# Patient Record
Sex: FEMALE | Race: WHITE | Employment: OTHER | URBAN - METROPOLITAN AREA
[De-identification: names, ages, dates, MRNs, and addresses within clinical notes are randomized per-mention and may not be internally consistent; named-entity substitution may affect disease eponyms.]

---

## 2017-01-10 ENCOUNTER — OFFICE VISIT (OUTPATIENT)
Dept: FAMILY MEDICINE | Facility: CLINIC | Age: 77
End: 2017-01-10
Payer: COMMERCIAL

## 2017-01-10 VITALS
HEART RATE: 88 BPM | BODY MASS INDEX: 31.53 KG/M2 | RESPIRATION RATE: 20 BRPM | WEIGHT: 167 LBS | TEMPERATURE: 97.5 F | DIASTOLIC BLOOD PRESSURE: 78 MMHG | HEIGHT: 61 IN | SYSTOLIC BLOOD PRESSURE: 128 MMHG | OXYGEN SATURATION: 97 %

## 2017-01-10 DIAGNOSIS — G89.4 CHRONIC PAIN SYNDROME: ICD-10-CM

## 2017-01-10 DIAGNOSIS — M48.061 LUMBAR SPINAL STENOSIS: ICD-10-CM

## 2017-01-10 DIAGNOSIS — D50.9 IRON DEFICIENCY ANEMIA, UNSPECIFIED IRON DEFICIENCY ANEMIA TYPE: ICD-10-CM

## 2017-01-10 DIAGNOSIS — Z23 NEED FOR PROPHYLACTIC VACCINATION AGAINST STREPTOCOCCUS PNEUMONIAE (PNEUMOCOCCUS): ICD-10-CM

## 2017-01-10 DIAGNOSIS — M79.7 FIBROMYALGIA: ICD-10-CM

## 2017-01-10 DIAGNOSIS — I48.0 PAF (PAROXYSMAL ATRIAL FIBRILLATION) (H): ICD-10-CM

## 2017-01-10 DIAGNOSIS — E78.5 HYPERLIPIDEMIA WITH TARGET LDL LESS THAN 100: ICD-10-CM

## 2017-01-10 DIAGNOSIS — Z00.00 ROUTINE GENERAL MEDICAL EXAMINATION AT A HEALTH CARE FACILITY: Primary | ICD-10-CM

## 2017-01-10 DIAGNOSIS — K21.9 GASTROESOPHAGEAL REFLUX DISEASE WITHOUT ESOPHAGITIS: ICD-10-CM

## 2017-01-10 DIAGNOSIS — I10 HYPERTENSION GOAL BP (BLOOD PRESSURE) < 140/90: ICD-10-CM

## 2017-01-10 DIAGNOSIS — E03.9 HYPOTHYROIDISM, UNSPECIFIED TYPE: ICD-10-CM

## 2017-01-10 DIAGNOSIS — R73.01 IMPAIRED FASTING GLUCOSE: ICD-10-CM

## 2017-01-10 DIAGNOSIS — Z23 NEED FOR PROPHYLACTIC VACCINATION AND INOCULATION AGAINST INFLUENZA: ICD-10-CM

## 2017-01-10 DIAGNOSIS — I87.2 CHRONIC VENOUS INSUFFICIENCY: ICD-10-CM

## 2017-01-10 LAB
ALBUMIN SERPL-MCNC: 3.8 G/DL (ref 3.4–5)
ALP SERPL-CCNC: 75 U/L (ref 40–150)
ALT SERPL W P-5'-P-CCNC: 26 U/L (ref 0–50)
ANION GAP SERPL CALCULATED.3IONS-SCNC: 6 MMOL/L (ref 3–14)
AST SERPL W P-5'-P-CCNC: 31 U/L (ref 0–45)
BASOPHILS # BLD AUTO: 0.1 10E9/L (ref 0–0.2)
BASOPHILS NFR BLD AUTO: 0.7 %
BILIRUB SERPL-MCNC: 0.7 MG/DL (ref 0.2–1.3)
BUN SERPL-MCNC: 37 MG/DL (ref 7–30)
CALCIUM SERPL-MCNC: 8.8 MG/DL (ref 8.5–10.1)
CHLORIDE SERPL-SCNC: 100 MMOL/L (ref 94–109)
CHOLEST SERPL-MCNC: 155 MG/DL
CO2 SERPL-SCNC: 31 MMOL/L (ref 20–32)
CREAT SERPL-MCNC: 1.39 MG/DL (ref 0.52–1.04)
DIFFERENTIAL METHOD BLD: ABNORMAL
EOSINOPHIL # BLD AUTO: 0 10E9/L (ref 0–0.7)
EOSINOPHIL NFR BLD AUTO: 0 %
ERYTHROCYTE [DISTWIDTH] IN BLOOD BY AUTOMATED COUNT: 13 % (ref 10–15)
FERRITIN SERPL-MCNC: 25 NG/ML (ref 8–252)
GFR SERPL CREATININE-BSD FRML MDRD: 37 ML/MIN/1.7M2
GLUCOSE SERPL-MCNC: 107 MG/DL (ref 70–99)
HBA1C MFR BLD: 6 % (ref 4.3–6)
HCT VFR BLD AUTO: 40.3 % (ref 35–47)
HDLC SERPL-MCNC: 41 MG/DL
HGB BLD-MCNC: 13.2 G/DL (ref 11.7–15.7)
IRON SATN MFR SERPL: 24 % (ref 15–46)
IRON SERPL-MCNC: 86 UG/DL (ref 35–180)
LDLC SERPL CALC-MCNC: 79 MG/DL
LYMPHOCYTES # BLD AUTO: 2.3 10E9/L (ref 0.8–5.3)
LYMPHOCYTES NFR BLD AUTO: 24.4 %
MCH RBC QN AUTO: 29.8 PG (ref 26.5–33)
MCHC RBC AUTO-ENTMCNC: 32.8 G/DL (ref 31.5–36.5)
MCV RBC AUTO: 91 FL (ref 78–100)
MONOCYTES # BLD AUTO: 1.7 10E9/L (ref 0–1.3)
MONOCYTES NFR BLD AUTO: 17.9 %
NEUTROPHILS # BLD AUTO: 5.4 10E9/L (ref 1.6–8.3)
NEUTROPHILS NFR BLD AUTO: 57 %
NONHDLC SERPL-MCNC: 114 MG/DL
PLATELET # BLD AUTO: 242 10E9/L (ref 150–450)
POTASSIUM SERPL-SCNC: 3.6 MMOL/L (ref 3.4–5.3)
PROT SERPL-MCNC: 7.6 G/DL (ref 6.8–8.8)
RBC # BLD AUTO: 4.43 10E12/L (ref 3.8–5.2)
SODIUM SERPL-SCNC: 137 MMOL/L (ref 133–144)
TIBC SERPL-MCNC: 356 UG/DL (ref 240–430)
TRIGL SERPL-MCNC: 176 MG/DL
TSH SERPL DL<=0.005 MIU/L-ACNC: 0.65 MU/L (ref 0.4–4)
WBC # BLD AUTO: 9.6 10E9/L (ref 4–11)

## 2017-01-10 PROCEDURE — 90662 IIV NO PRSV INCREASED AG IM: CPT | Performed by: INTERNAL MEDICINE

## 2017-01-10 PROCEDURE — 90670 PCV13 VACCINE IM: CPT | Performed by: INTERNAL MEDICINE

## 2017-01-10 PROCEDURE — 83540 ASSAY OF IRON: CPT | Performed by: INTERNAL MEDICINE

## 2017-01-10 PROCEDURE — 85025 COMPLETE CBC W/AUTO DIFF WBC: CPT | Performed by: INTERNAL MEDICINE

## 2017-01-10 PROCEDURE — 84443 ASSAY THYROID STIM HORMONE: CPT | Performed by: INTERNAL MEDICINE

## 2017-01-10 PROCEDURE — G0009 ADMIN PNEUMOCOCCAL VACCINE: HCPCS | Performed by: INTERNAL MEDICINE

## 2017-01-10 PROCEDURE — 83550 IRON BINDING TEST: CPT | Performed by: INTERNAL MEDICINE

## 2017-01-10 PROCEDURE — G0008 ADMIN INFLUENZA VIRUS VAC: HCPCS | Performed by: INTERNAL MEDICINE

## 2017-01-10 PROCEDURE — 99397 PER PM REEVAL EST PAT 65+ YR: CPT | Mod: 25 | Performed by: INTERNAL MEDICINE

## 2017-01-10 PROCEDURE — 82728 ASSAY OF FERRITIN: CPT | Performed by: INTERNAL MEDICINE

## 2017-01-10 PROCEDURE — 36415 COLL VENOUS BLD VENIPUNCTURE: CPT | Performed by: INTERNAL MEDICINE

## 2017-01-10 PROCEDURE — 80061 LIPID PANEL: CPT | Performed by: INTERNAL MEDICINE

## 2017-01-10 PROCEDURE — 80053 COMPREHEN METABOLIC PANEL: CPT | Performed by: INTERNAL MEDICINE

## 2017-01-10 PROCEDURE — 83036 HEMOGLOBIN GLYCOSYLATED A1C: CPT | Performed by: INTERNAL MEDICINE

## 2017-01-10 RX ORDER — POTASSIUM CHLORIDE 750 MG/1
10 TABLET, EXTENDED RELEASE ORAL 2 TIMES DAILY
Qty: 180 TABLET | Refills: 3 | Status: SHIPPED | OUTPATIENT
Start: 2017-01-10 | End: 2018-01-17

## 2017-01-10 RX ORDER — ATORVASTATIN CALCIUM 40 MG/1
40 TABLET, FILM COATED ORAL DAILY
Qty: 90 TABLET | Refills: 3 | Status: SHIPPED | OUTPATIENT
Start: 2017-01-10 | End: 2018-01-17

## 2017-01-10 RX ORDER — TRAMADOL HYDROCHLORIDE 50 MG/1
50 TABLET ORAL 3 TIMES DAILY
Qty: 270 TABLET | Refills: 3 | Status: SHIPPED | OUTPATIENT
Start: 2017-01-10 | End: 2017-04-21

## 2017-01-10 RX ORDER — TRAZODONE HYDROCHLORIDE 50 MG/1
50 TABLET, FILM COATED ORAL AT BEDTIME
Qty: 90 TABLET | Refills: 3 | Status: SHIPPED | OUTPATIENT
Start: 2017-01-10 | End: 2018-01-17

## 2017-01-10 RX ORDER — BUMETANIDE 1 MG/1
TABLET ORAL
Qty: 180 TABLET | Refills: 3 | COMMUNITY
Start: 2017-01-10 | End: 2018-01-17

## 2017-01-10 RX ORDER — BUMETANIDE 1 MG/1
TABLET ORAL
Qty: 180 TABLET | Refills: 3 | Status: SHIPPED | OUTPATIENT
Start: 2017-01-10 | End: 2017-01-10

## 2017-01-10 RX ORDER — METOPROLOL TARTRATE 50 MG
50 TABLET ORAL 2 TIMES DAILY
Qty: 180 TABLET | Refills: 3 | Status: SHIPPED | OUTPATIENT
Start: 2017-01-10 | End: 2018-01-17

## 2017-01-10 RX ORDER — BUSPIRONE HYDROCHLORIDE 10 MG/1
10 TABLET ORAL 2 TIMES DAILY
Qty: 180 TABLET | Refills: 3 | Status: SHIPPED | OUTPATIENT
Start: 2017-01-10 | End: 2018-01-17

## 2017-01-10 RX ORDER — LEVOTHYROXINE SODIUM 137 UG/1
137 TABLET ORAL DAILY
Qty: 90 TABLET | Refills: 3 | Status: SHIPPED | OUTPATIENT
Start: 2017-01-10 | End: 2018-01-17

## 2017-01-10 RX ORDER — FERROUS SULFATE 325(65) MG
1 TABLET ORAL EVERY OTHER DAY
Qty: 30 TABLET | Refills: 2 | COMMUNITY
Start: 2017-01-10 | End: 2018-01-17

## 2017-01-10 NOTE — NURSING NOTE
"Chief Complaint   Patient presents with     Physical       Initial /78 mmHg  Pulse 88  Temp(Src) 97.5  F (36.4  C)  Resp 20  Ht 5' 1\" (1.549 m)  Wt 167 lb (75.751 kg)  BMI 31.57 kg/m2  SpO2 97%  Breastfeeding? No Estimated body mass index is 31.57 kg/(m^2) as calculated from the following:    Height as of this encounter: 5' 1\" (1.549 m).    Weight as of this encounter: 167 lb (75.751 kg).  BP completed using cuff size: zoraida Galdamez LPN  "

## 2017-01-10 NOTE — NURSING NOTE
Prior to injection verified patient identity using patient's name and date of birth.  Screening Questionnaire for Adult Immunization    Are you sick today?   No   Do you have allergies to medications, food, a vaccine component or latex?   No   Have you ever had a serious reaction after receiving a vaccination?   No   Do you have a long-term health problem with heart disease, lung disease, asthma, kidney disease, metabolic disease (e.g. diabetes), anemia, or other blood disorder?   No   Do you have cancer, leukemia, HIV/AIDS, or any other immune system problem?   No   In the past 3 months, have you taken medications that affect  your immune system, such as prednisone, other steroids, or anticancer drugs; drugs for the treatment of rheumatoid arthritis, Crohn s disease, or psoriasis; or have you had radiation treatments?   No   Have you had a seizure, or a brain or other nervous system problem?   No   During the past year, have you received a transfusion of blood or blood     products, or been given immune (gamma) globulin or antiviral drug?   No   For women: Are you pregnant or is there a chance you could become        pregnant during the next month?   No   Have you received any vaccinations in the past 4 weeks?   No     Immunization questionnaire answers were all negative.      MNVFC doesn't apply on this patient    Per orders of Dr. Narayanan, injection of Prevnar and Influenza given by Shanna Galdamez. Patient instructed to remain in clinic for 20 minutes afterwards, and to report any adverse reaction to me immediately.       Screening performed by Shanna Galdamez on 1/10/2017 at 11:54 AM.

## 2017-01-10 NOTE — PROGRESS NOTES
SUBJECTIVE:                                                            Jennifer Torres is a 76 year old female who presents for Preventive Visit.      Are you in the first 12 months of your Medicare Part B coverage?  No    Healthy Habits:    Do you get at least three servings of calcium containing foods daily (dairy, green leafy vegetables, etc.)? yes    Amount of exercise or daily activities, outside of work: occ.    Problems taking medications regularly No    Medication side effects: No    Have you had an eye exam in the past two years? yes    Do you see a dentist twice per year? yes    Do you have sleep apnea, excessive snoring or daytime drowsiness? Has CPAP, but declines to wear    COGNITIVE SCREEN  1) Repeat 3 items (Banana, Sunrise, Chair)    2) Clock draw: NORMAL  3) 3 item recall: Recalls 3 objects  Results: 3 items recalled: COGNITIVE IMPAIRMENT LESS LIKELY    Mini-CogTM Copyright S James. Licensed by the author for use in [facility]; reprinted with permission (iglesia@[email or web address]). All rights reserved.                    Some med changes per Cardiology.        Off amio,warfarin,amlo.        bumex 2 mg AM; KCL 20 meq per day. Metoprolol 50 bid.                                                                                          Grieving; a 33 yr old nephew killed himself.            All Histories reviewed and updated in [facility] as appropriate.  Social History   Substance Use Topics     Smoking status: Never Smoker      Smokeless tobacco: Never Used     Alcohol Use: Yes      Comment: occ.       The patient does not drink >3 drinks per day nor >7 drinks per week.    Today's PHQ-2 Score:   PHQ-2 ( 1999 Pfizer) 1/10/2017 9/6/2016   Q1: Little interest or pleasure in doing things 0 1   Q2: Feeling down, depressed or hopeless 0 1   PHQ-2 Score 0 2       Do you feel safe in your environment - Yes    Do you have a Health Care Directive?: Yes: Patient states has Advance Directive and will bring in a  copy to clinic.    Current providers sharing in care for this patient include:   Patient Care Team:  Rodney Narayanan MD as PCP - General (Internal Medicine)  Cheryl Estrada, RN as  (Family Medicine - Geriatric Medicine)      Hearing impairment: No    Ability to successfully perform activities of daily living: Yes, no assistance needed     Fall risk:  Fallen 2 or more times in the past year?: No  Any fall with injury in the past year?: No    Home safety:  none identified      The following health maintenance items are reviewed in Epic and correct as of today:  Health Maintenance   Topic Date Due     NOEMÍ QUESTIONNAIRE 1 YEAR  1940     URINE DRUG SCREEN Q1 YR  08/16/1955     ADVANCE DIRECTIVE PLANNING Q5 YRS (NO INBASKET)  08/16/1957     PNEUMOCOCCAL (2 of 2 - PCV13) 10/28/2010     PHQ-9 Q1YR (NO INBASKET)  07/10/2014     INFLUENZA VACCINE (SYSTEM ASSIGNED)  09/01/2016     FALL RISK ASSESSMENT  01/06/2017     LIPID SCREEN Q5 YR FEMALE (SYSTEM ASSIGNED)  01/06/2021     TETANUS IMMUNIZATION (SYSTEM ASSIGNED)  12/29/2024     DEXA SCAN SCREENING (SYSTEM ASSIGNED)  Completed         Pneumonia Vaccine:Adults age 65+ who received Pneumovax (PPSV23) at 65 years or older: Should be given PCV13 > 1 year after their most recent PPSV23  Mammogram Screening: Patient over age 75, has elected to continue with mammography screening.     Scheduled in the near future.     ROS:  C: NEGATIVE for fever, chills, change in weight  CONSTITUTIONAL:POSITIVE  for fatigue  I: NEGATIVE for worrisome rashes, moles or lesions  E: NEGATIVE for vision changes or irritation  E/M: NEGATIVE for ear, mouth and throat problems  R: NEGATIVE for significant cough or SOB  B: NEGATIVE for masses, tenderness or discharge  CV: NEGATIVE for chest pain/chest pressure and palpitations  GI: NEGATIVE for nausea, abdominal pain, heartburn, or change in bowel habits  : NEGATIVE for frequency, dysuria, or hematuria  MUSCULOSKELETAL:POSITIVE  for  arthralgias  and myalgia  N: NEGATIVE for weakness, dizziness or paresthesias  E: NEGATIVE for temperature intolerance, skin/hair changes  H: NEGATIVE for bleeding problems  P: NEGATIVE for changes in mood or affect    BP Readings from Last 3 Encounters:   01/10/17 128/78   09/06/16 124/64   01/06/16 138/78    Wt Readings from Last 3 Encounters:   01/10/17 167 lb (75.751 kg)   09/06/16 168 lb (76.204 kg)   01/06/16 184 lb (83.462 kg)                  Current Outpatient Prescriptions   Medication Sig Dispense Refill     traMADol (ULTRAM) 50 MG tablet Take 1 tablet (50 mg) by mouth 3 times daily 270 tablet 3     bisacodyl (DULCOLAX) 5 MG EC tablet Take 5-10 mg by mouth       nitroglycerin (NITROSTAT) 0.4 MG SL tablet Place 0.4 mg under the tongue       metoprolol (TOPROL XL) 50 MG 24 hr tablet Take 100 mg per day       bumetanide (BUMEX) 1 MG tablet TAKE 1 TABLET (1 MG) BY MOUTH 2 TIMES DAILY 180 tablet 3     busPIRone (BUSPAR) 10 MG tablet Take 1 tablet (10 mg) by mouth 2 times daily 180 tablet 3     levothyroxine (SYNTHROID, LEVOTHROID) 137 MCG tablet Take 1 tablet (137 mcg) by mouth daily 90 tablet 3     potassium chloride (K-DUR) 10 MEQ tablet Take 1 tablet (10 mEq) by mouth 2 times daily 180 tablet 3     atorvastatin (LIPITOR) 40 MG tablet Take 1 tablet (40 mg) by mouth daily 90 tablet 3     omeprazole (PRILOSEC) 20 MG capsule Take 1 capsule (20 mg) by mouth daily 90 capsule 3     aspirin 81 MG tablet Take 1 tablet by mouth daily       Multiple Vitamins-Minerals (MULTIVITAMIN OR) Take 1 tablet by mouth daily.       fish oil-omega-3 fatty acids (FISH OIL) 1000 MG capsule Take 1 g by mouth daily.               Blood Glucose Monitoring Suppl (BLOOD GLUCOSE SYSTEM YARELY) KIT Dispense meter, test strips, lancets covered by pt ins. 250.00 NIDDM type II - Test 1 time/day       traZODone (DESYREL) 50 MG tablet Take 50 mg by mouth       warfarin (COUMADIN) 5 MG tablet Take by mouth 2.5 mg (1/2 tab) daily and INR on 8/29 at  "2:10pm at [facility].          3     Cholecalciferol (VITAMIN D) 2000 UNITS CAPS Take 2 capsules by mouth daily.       No Known Allergies  OBJECTIVE:                                                            /78 mmHg  Pulse 88  Temp(Src) 97.5  F (36.4  C)  Resp 20  Ht 5' 1\" (1.549 m)  Wt 167 lb (75.751 kg)  BMI 31.57 kg/m2  SpO2 97%  Breastfeeding? No Estimated body mass index is 31.57 kg/(m^2) as calculated from the following:    Height as of this encounter: 5' 1\" (1.549 m).    Weight as of this encounter: 167 lb (75.751 kg).  EXAM:   GENERAL APPEARANCE: alert, no distress and fatigued  HENT: ear canals and TM's normal, nose and mouth without ulcers or lesions, oropharynx clear and oral mucous membranes moist  NECK: no adenopathy, no asymmetry, masses, or scars and thyroid normal to palpation  RESP: lungs clear to auscultation - no rales, rhonchi or wheezes  BREAST: fibrocystic changes bilateral  CV: regular rates and rhythm, normal S1 S2, no S3 or S4, no murmur, click or rub, peripheral pulses strong and trace edema  ABDOMEN: soft, nontender, no hepatosplenomegaly, no masses and bowel sounds normal  MS: no musculoskeletal defects are noted and gait is age appropriate without ataxia  SKIN: no suspicious lesions or rashes  NEURO: Normal strength and tone, sensory exam grossly normal, mentation intact and speech normal  PSYCH: fatigued    ASSESSMENT / PLAN:                                                            Jennifer was seen today for physical.    Diagnoses and all orders for this visit:    Routine general medical examination at a health care facility    Chronic pain syndrome  -     traMADol (ULTRAM) 50 MG tablet; Take 1 tablet (50 mg) by mouth 3 times daily    Hyperlipidemia with target LDL less than 100  -     Comprehensive metabolic panel (BMP + Alb, Alk Phos, ALT, AST, Total. Bili, TP)  -     Lipid Profile (Chol, Trig, HDL, LDL calc)  -     atorvastatin (LIPITOR) 40 MG tablet; Take 1 tablet (40 " mg) by mouth daily    Hypertension goal BP (blood pressure) < 140/90  -     Comprehensive metabolic panel (BMP + Alb, Alk Phos, ALT, AST, Total. Bili, TP)  -     bumetanide (BUMEX) 1 MG tablet; TAKE 1 TABLET (1 MG) BY MOUTH 2 TIMES DAILY  -     metoprolol (LOPRESSOR) 50 MG tablet; Take 1 tablet (50 mg) by mouth 2 times daily    Impaired fasting glucose  -     Comprehensive metabolic panel (BMP + Alb, Alk Phos, ALT, AST, Total. Bili, TP)  -     Hemoglobin A1c    Hypothyroidism, unspecified type  -     TSH with free T4 reflex  -     levothyroxine (SYNTHROID/LEVOTHROID) 137 MCG tablet; Take 1 tablet (137 mcg) by mouth daily    Iron deficiency anemia, unspecified iron deficiency anemia type  -     CBC with platelets and differential  -     Ferritin  -     Iron and iron binding capacity    Fibromyalgia  -     traMADol (ULTRAM) 50 MG tablet; Take 1 tablet (50 mg) by mouth 3 times daily  -     traZODone (DESYREL) 50 MG tablet; Take 1 tablet (50 mg) by mouth At Bedtime    Chronic venous insufficiency  -     potassium chloride (K-TAB,KLOR-CON) 10 MEQ tablet; Take 1 tablet (10 mEq) by mouth 2 times daily    PAF (paroxysmal atrial fibrillation) (H)    Lumbar spinal stenosis  -     traMADol (ULTRAM) 50 MG tablet; Take 1 tablet (50 mg) by mouth 3 times daily    Gastroesophageal reflux disease without esophagitis  -     omeprazole (PRILOSEC) 20 MG CR capsule; Take 1 capsule (20 mg) by mouth daily    Need for prophylactic vaccination and inoculation against influenza  -     FLU VACCINE, INCREASED ANTIGEN, PRESV FREE, AGE 65+ [77560]  -     ADMIN INFLUENZA[] (For MEDICARE Patients ONLY)     Need for prophylactic vaccination against Streptococcus pneumoniae (pneumococcus)  -     Pneumococcal vaccine 13 valent PCV13 IM (Prevnar) [78489]  -     ADMIN PNEUMOCOCCAL VACCINE (For MEDICARE Pt. ONLY) []    Other orders  -     busPIRone (BUSPAR) 10 MG tablet; Take 1 tablet (10 mg) by mouth 2 times daily        Multiple issues.        Check labs and adjust medications as indicated.      Med list updated and corrected.            Ok for tramadol refill.   Patient Instructions   I will let you know your lab results.               Prescription Medications as of 1/10/2017      After the visit            traMADol (ULTRAM) 50 MG tablet Take 1 tablet (50 mg) by mouth 3 times daily    potassium chloride (K-TAB,KLOR-CON) 10 MEQ tablet Take 1 tablet (10 mEq) by mouth 2 times daily    omeprazole (PRILOSEC) 20 MG CR capsule Take 1 capsule (20 mg) by mouth daily    levothyroxine (SYNTHROID/LEVOTHROID) 137 MCG tablet Take 1 tablet (137 mcg) by mouth daily    busPIRone (BUSPAR) 10 MG tablet Take 1 tablet (10 mg) by mouth 2 times daily    bumetanide (BUMEX) 1 MG tablet TAKE 1 TABLET (1 MG) BY MOUTH 2 TIMES DAILY    metoprolol (LOPRESSOR) 50 MG tablet Take 1 tablet (50 mg) by mouth 2 times daily    traZODone (DESYREL) 50 MG tablet Take 1 tablet (50 mg) by mouth At Bedtime    atorvastatin (LIPITOR) 40 MG tablet Take 1 tablet (40 mg) by mouth daily    bisacodyl (DULCOLAX) 5 MG EC tablet Take 5-10 mg by mouth    nitroglycerin (NITROSTAT) 0.4 MG SL tablet Place 0.4 mg under the tongue    aspirin 81 MG tablet Take 1 tablet by mouth daily    Multiple Vitamins-Minerals (MULTIVITAMIN OR) Take 1 tablet by mouth daily.    fish oil-omega-3 fatty acids (FISH OIL) 1000 MG capsule Take 1 g by mouth daily.    Blood Glucose Monitoring Suppl (BLOOD GLUCOSE SYSTEM YARELY) KIT Dispense meter, test strips, lancets covered by pt ins. 250.00 NIDDM type II - Test 1 time/day    Cholecalciferol (VITAMIN D) 2000 UNITS CAPS Take 2 capsules by mouth daily.                End of Life Planning:  Patient currently has an advanced directive: Yes.  Practitioner is supportive of decision.    COUNSELING:  Reviewed preventive health counseling, as reflected in patient instructions       Regular exercise       Healthy diet/nutrition        Estimated body mass index is 31.57 kg/(m^2) as calculated from  "the following:    Height as of this encounter: 5' 1\" (1.549 m).    Weight as of this encounter: 167 lb (75.751 kg).  Weight management plan: Discussed healthy diet and exercise guidelines and patient will follow up in 12 months in clinic to re-evaluate.   reports that she has never smoked. She has never used smokeless tobacco.      Appropriate preventive services were discussed with this patient, including applicable screening as appropriate for cardiovascular disease, diabetes, osteopenia/osteoporosis, and glaucoma.  As appropriate for age/gender, discussed screening for colorectal cancer, prostate cancer, breast cancer, and cervical cancer. Checklist reviewing preventive services available has been given to the patient.    Reviewed patients plan of care and provided an AVS. The Basic Care Plan (routine screening as documented in Health Maintenance) for Jennifer meets the Care Plan requirement. This Care Plan has been established and reviewed with the Patient.    Counseling Resources:  ATP IV Guidelines  Pooled Cohorts Equation Calculator  Breast Cancer Risk Calculator  FRAX Risk Assessment  ICSI Preventive Guidelines  Dietary Guidelines for Americans, 2010  USDA's MyPlate  ASA Prophylaxis  Lung CA Screening    Rodney Narayanan MD  [facility]   Results for orders placed or performed in visit on 01/10/17   Comprehensive metabolic panel (BMP + Alb, Alk Phos, ALT, AST, Total. Bili, TP)   Result Value Ref Range    Sodium 137 133 - 144 mmol/L    Potassium 3.6 3.4 - 5.3 mmol/L    Chloride 100 94 - 109 mmol/L    Carbon Dioxide 31 20 - 32 mmol/L    Anion Gap 6 3 - 14 mmol/L    Glucose 107 (H) 70 - 99 mg/dL    Urea Nitrogen 37 (H) 7 - 30 mg/dL    Creatinine 1.39 (H) 0.52 - 1.04 mg/dL    GFR Estimate 37 (L) >60 mL/min/1.7m2    GFR Estimate If Black 44 (L) >60 mL/min/1.7m2    Calcium 8.8 8.5 - 10.1 mg/dL    Bilirubin Total 0.7 0.2 - 1.3 mg/dL    Albumin 3.8 3.4 - 5.0 g/dL    Protein Total 7.6 6.8 " - 8.8 g/dL    Alkaline Phosphatase 75 40 - 150 U/L    ALT 26 0 - 50 U/L    AST 31 0 - 45 U/L   Hemoglobin A1c   Result Value Ref Range    Hemoglobin A1C 6.0 4.3 - 6.0 %   CBC with platelets and differential   Result Value Ref Range    WBC 9.6 4.0 - 11.0 10e9/L    RBC Count 4.43 3.8 - 5.2 10e12/L    Hemoglobin 13.2 11.7 - 15.7 g/dL    Hematocrit 40.3 35.0 - 47.0 %    MCV 91 78 - 100 fl    MCH 29.8 26.5 - 33.0 pg    MCHC 32.8 31.5 - 36.5 g/dL    RDW 13.0 10.0 - 15.0 %    Platelet Count 242 150 - 450 10e9/L    Diff Method Automated Method     % Neutrophils 57.0 %    % Lymphocytes 24.4 %    % Monocytes 17.9 %    % Eosinophils 0.0 %    % Basophils 0.7 %    Absolute Neutrophil 5.4 1.6 - 8.3 10e9/L    Absolute Lymphocytes 2.3 0.8 - 5.3 10e9/L    Absolute Monocytes 1.7 (H) 0.0 - 1.3 10e9/L    Absolute Eosinophils 0.0 0.0 - 0.7 10e9/L    Absolute Basophils 0.1 0.0 - 0.2 10e9/L   Ferritin   Result Value Ref Range    Ferritin 25 8 - 252 ng/mL   Iron and iron binding capacity   Result Value Ref Range    Iron 86 35 - 180 ug/dL    Iron Binding Cap 356 240 - 430 ug/dL    Iron Saturation Index 24 15 - 46 %   TSH with free T4 reflex   Result Value Ref Range    TSH 0.65 0.40 - 4.00 mU/L   Lipid Profile (Chol, Trig, HDL, LDL calc)   Result Value Ref Range    Cholesterol 155 <200 mg/dL    Triglycerides 176 (H) <150 mg/dL    HDL Cholesterol 41 (L) >49 mg/dL    LDL Cholesterol Calculated 79 <100 mg/dL    Non HDL Cholesterol 114 <130 mg/dL     Letter sent, and My chart message sent.                 Many of your lab results are good.    The thyroid level is good. Keep taking the same dosage.               However,there is mild kidney impairment.     The bumex dose should be cut back.    Try taking 1.5 mg, or 1 1/2 tablets per day instead of 2 mg or 2 tablets per day.       Also, your iron level is a bit low again.        I suggest that you resume the ferrous sulfate, at 325 mg every other day.          Also,your blood sugar (glucose) is  slightly elevated at 107.    Please work harder on restricting carbohydrates ( starches, sweets, etc).

## 2017-01-10 NOTE — Clinical Note
[facility]  [address]  [place] [address] [phone]                                                                                                           Jennifer Torres  [address] [address] [address]    January 10, 2017      Dear Jennifer,    The results of your recent tests were reviewed and are enclosed.           Many of your lab results are good.                                                                                       The thyroid level is good. Keep taking the same dosage.         However,there is mild kidney impairment.     The bumex dose should be cut back.    Try taking 1.5 mg, or 1 1/2 tablets per day instead of 2 mg or 2 tablets per day.                      Also, your iron level is a bit low again.        I suggest that you resume the ferrous sulfate, at 325 mg every other day.                                                                                            Also,your blood sugar (glucose) is slightly elevated at 107.    Please work harder on restricting carbohydrates ( starches, sweets, etc).      Thank you for choosing [facility].  We appreciate the opportunity to serve you and look forward to supporting your healthcare needs in the future.    If you have any questions or concerns, please call me or my staff at [phone].      Sincerely,    Rodney Narayanan MD    Results for orders placed or performed in visit on 01/10/17   Comprehensive metabolic panel (BMP + Alb, Alk Phos, ALT, AST, Total. Bili, TP)   Result Value Ref Range    Sodium 137 133 - 144 mmol/L    Potassium 3.6 3.4 - 5.3 mmol/L    Chloride 100 94 - 109 mmol/L    Carbon Dioxide 31 20 - 32 mmol/L    Anion Gap 6 3 - 14 mmol/L    Glucose 107 (H) 70 - 99 mg/dL    Urea Nitrogen 37 (H) 7 - 30 mg/dL    Creatinine 1.39 (H) 0.52 - 1.04 mg/dL    GFR Estimate 37 (L) >60 mL/min/1.7m2    GFR Estimate If Black 44  (L) >60 mL/min/1.7m2    Calcium 8.8 8.5 - 10.1 mg/dL    Bilirubin Total 0.7 0.2 - 1.3 mg/dL    Albumin 3.8 3.4 - 5.0 g/dL    Protein Total 7.6 6.8 - 8.8 g/dL    Alkaline Phosphatase 75 40 - 150 U/L    ALT 26 0 - 50 U/L    AST 31 0 - 45 U/L   Hemoglobin A1c   Result Value Ref Range    Hemoglobin A1C 6.0 4.3 - 6.0 %   CBC with platelets and differential   Result Value Ref Range    WBC 9.6 4.0 - 11.0 10e9/L    RBC Count 4.43 3.8 - 5.2 10e12/L    Hemoglobin 13.2 11.7 - 15.7 g/dL    Hematocrit 40.3 35.0 - 47.0 %    MCV 91 78 - 100 fl    MCH 29.8 26.5 - 33.0 pg    MCHC 32.8 31.5 - 36.5 g/dL    RDW 13.0 10.0 - 15.0 %    Platelet Count 242 150 - 450 10e9/L    Diff Method Automated Method     % Neutrophils 57.0 %    % Lymphocytes 24.4 %    % Monocytes 17.9 %    % Eosinophils 0.0 %    % Basophils 0.7 %    Absolute Neutrophil 5.4 1.6 - 8.3 10e9/L    Absolute Lymphocytes 2.3 0.8 - 5.3 10e9/L    Absolute Monocytes 1.7 (H) 0.0 - 1.3 10e9/L    Absolute Eosinophils 0.0 0.0 - 0.7 10e9/L    Absolute Basophils 0.1 0.0 - 0.2 10e9/L   Ferritin   Result Value Ref Range    Ferritin 25 8 - 252 ng/mL   Iron and iron binding capacity   Result Value Ref Range    Iron 86 35 - 180 ug/dL    Iron Binding Cap 356 240 - 430 ug/dL    Iron Saturation Index 24 15 - 46 %   TSH with free T4 reflex   Result Value Ref Range    TSH 0.65 0.40 - 4.00 mU/L   Lipid Profile (Chol, Trig, HDL, LDL calc)   Result Value Ref Range    Cholesterol 155 <200 mg/dL    Triglycerides 176 (H) <150 mg/dL    HDL Cholesterol 41 (L) >49 mg/dL    LDL Cholesterol Calculated 79 <100 mg/dL    Non HDL Cholesterol 114 <130 mg/dL

## 2017-01-10 NOTE — PROGRESS NOTES
Injectable Influenza Immunization Documentation    1.  Is the person to be vaccinated sick today?  No    2. Does the person to be vaccinated have an allergy to eggs or to a component of the vaccine?  No    3. Has the person to be vaccinated today ever had a serious reaction to influenza vaccine in the past?  No    4. Has the person to be vaccinated ever had Guillain-Biloxi syndrome?  No     Form completed by Shanna Galdamez LPN

## 2017-01-10 NOTE — MR AVS SNAPSHOT
After Visit Summary   1/10/2017    Jennifer Torres    MRN: [id number]           Patient Information     Date Of Birth          1940        Visit Information        Provider Department      1/10/2017 10:00 AM Rodney Narayanan MD [facility]        Today's Diagnoses     Routine general medical examination at a health care facility    -  1     Chronic pain syndrome         Hyperlipidemia with target LDL less than 100         Hypertension goal BP (blood pressure) < 140/90         Impaired fasting glucose         Hypothyroidism, unspecified type         Iron deficiency anemia, unspecified iron deficiency anemia type         Fibromyalgia         Chronic venous insufficiency         PAF (paroxysmal atrial fibrillation) (H)         Lumbar spinal stenosis         Need for prophylactic vaccination and inoculation against influenza         Need for prophylactic vaccination against Streptococcus pneumoniae (pneumococcus)         Gastroesophageal reflux disease without esophagitis           Care Instructions    I will let you know your lab results.           Follow-ups after your visit        Who to contact     If you have questions or need follow up information about today's clinic visit or your schedule please contact [facility] directly at [phone].  Normal or non-critical lab and imaging results will be communicated to you by Drill Cyclehart, letter or phone within 4 business days after the clinic has received the results. If you do not hear from us within 7 days, please contact the clinic through Drill Cyclehart or phone. If you have a critical or abnormal lab result, we will notify you by phone as soon as possible.  Submit refill requests through Rivalf[employer] or call your pharmacy and they will forward the refill request to us. Please allow 3 business days for your refill to be completed.          Additional Information About Your Visit        Drill CycleharGenterpret  "Information     [employer] gives you secure access to your electronic health record. If you see a primary care provider, you can also send messages to your care team and make appointments. If you have questions, please call your primary care clinic.  If you do not have a primary care provider, please call [phone] and they will assist you.        Care EveryWhere ID     This is your Care EveryWhere ID. This could be used by other organizations to access your [facility] medical records  [id number]        Your Vitals Were     Pulse Temperature Respirations Height BMI (Body Mass Index) Pulse Oximetry    88 97.5  F (36.4  C) 20 5' 1\" (1.549 m) 31.57 kg/m2 97%    Breastfeeding?                   No            Blood Pressure from Last 3 Encounters:   01/10/17 128/78   09/06/16 124/64   01/06/16 138/78    Weight from Last 3 Encounters:   01/10/17 167 lb (75.751 kg)   09/06/16 168 lb (76.204 kg)   01/06/16 184 lb (83.462 kg)              We Performed the Following     CBC with platelets and differential     Comprehensive metabolic panel (BMP + Alb, Alk Phos, ALT, AST, Total. Bili, TP)     Ferritin     Hemoglobin A1c     Iron and iron binding capacity     Lipid Profile (Chol, Trig, HDL, LDL calc)     TSH with free T4 reflex          Today's Medication Changes          These changes are accurate as of: 1/10/17 10:45 AM.  If you have any questions, ask your nurse or doctor.               Start taking these medicines.        Dose/Directions    metoprolol 50 MG tablet   Commonly known as:  LOPRESSOR   Used for:  Hypertension goal BP (blood pressure) < 140/90   Started by:  Rodney Narayanan MD        Dose:  50 mg   Take 1 tablet (50 mg) by mouth 2 times daily   Quantity:  180 tablet   Refills:  3         These medicines have changed or have updated prescriptions.        Dose/Directions    traZODone 50 MG tablet   Commonly known as:  DESYREL   This may have changed:  when to take this   Used for:  Fibromyalgia   Changed by:  " Rodney Narayanan MD        Dose:  50 mg   Take 1 tablet (50 mg) by mouth At Bedtime   Quantity:  90 tablet   Refills:  3         Stop taking these medicines if you haven't already. Please contact your care team if you have questions.     TOPROL XL 50 MG 24 hr tablet   Generic drug:  metoprolol   Stopped by:  Rodney Narayanan MD                Where to get your medicines      These medications were sent to [id number] - [place], [place] - [address] [address]  [address] [place] [address]    Hours:  test fax successful 4/5/04 kr Phone:  [phone]    - atorvastatin 40 MG tablet  - bumetanide 1 MG tablet  - busPIRone 10 MG tablet  - levothyroxine 137 MCG tablet  - metoprolol 50 MG tablet  - omeprazole 20 MG CR capsule  - potassium chloride 10 MEQ tablet  - traZODone 50 MG tablet      Some of these will need a paper prescription and others can be bought over the counter.  Ask your nurse if you have questions.     Bring a paper prescription for each of these medications    - traMADol 50 MG tablet             Primary Care Provider Office Phone # Fax #    Rodney Narayanan -[phone]       [facility] [address] [place] [facility] [address]        Thank you!     Thank you for choosing [facility]  for your care. Our goal is always to provide you with excellent care. Hearing back from our patients is one way we can continue to improve our services. Please take a few minutes to complete the written survey that you may receive in the mail after your visit with us. Thank you!             Your Updated Medication List - Protect others around you: Learn how to safely use, store and throw away your medicines at www.disposemymeds.org.          This list is accurate as of: 1/10/17 10:45 AM.  Always use your most recent med list.                   Brand Name Dispense Instructions for use    aspirin 81 MG tablet      Take 1 tablet by mouth daily        atorvastatin 40 MG tablet    LIPITOR    90 tablet    Take 1 tablet (40 mg) by mouth daily       Blood Glucose System Aung Kit      Dispense meter, test strips, lancets covered by pt ins. 250.00 NIDDM type II - Test 1 time/day       bumetanide 1 MG tablet    BUMEX    180 tablet    TAKE 1 TABLET (1 MG) BY MOUTH 2 TIMES DAILY       busPIRone 10 MG tablet    BUSPAR    180 tablet    Take 1 tablet (10 mg) by mouth 2 times daily       DULCOLAX 5 MG EC tablet   Generic drug:  bisacodyl      Take 5-10 mg by mouth       fish oil-omega-3 fatty acids 1000 MG capsule      Take 1 g by mouth daily.       levothyroxine 137 MCG tablet    SYNTHROID/LEVOTHROID    90 tablet    Take 1 tablet (137 mcg) by mouth daily       metoprolol 50 MG tablet    LOPRESSOR    180 tablet    Take 1 tablet (50 mg) by mouth 2 times daily       MULTIVITAMIN PO      Take 1 tablet by mouth daily.       NITROSTAT 0.4 MG sublingual tablet   Generic drug:  nitroglycerin      Place 0.4 mg under the tongue       omeprazole 20 MG CR capsule    priLOSEC    90 capsule    Take 1 capsule (20 mg) by mouth daily       potassium chloride 10 MEQ tablet    K-TAB,KLOR-CON    180 tablet    Take 1 tablet (10 mEq) by mouth 2 times daily       traMADol 50 MG tablet    ULTRAM    270 tablet    Take 1 tablet (50 mg) by mouth 3 times daily       traZODone 50 MG tablet    DESYREL    90 tablet    Take 1 tablet (50 mg) by mouth At Bedtime       vitamin D 2000 UNITS Caps      Take 2 capsules by mouth daily.

## 2017-02-13 ENCOUNTER — TELEPHONE (OUTPATIENT)
Dept: FAMILY MEDICINE | Facility: CLINIC | Age: 77
End: 2017-02-13

## 2017-02-13 NOTE — TELEPHONE ENCOUNTER
Her kidney function has fluctuated over the past few years, worsening when she is acutely ill, and worsening when her diuretics are at a higher dose.                               At her recent visit she was asked to decrease her Bumex dose.                We should recheck her labs 2-3 months after the medication change was made.                            Please let the patient know.

## 2017-02-13 NOTE — TELEPHONE ENCOUNTER
Patient called reporting concern of GFR and creatinine results. She asked if she should be concern with changes from last year. Are kidneys worsening? Informed pt PCP is aware of kidney impairment. Advised she follow directions on lab letter re: Bumex dose. PCP notes mild kidney impairment.  Pt would like doctors opinion on lab results are GFR and creatininine changes significant? Please advice.  Lab results 02/10/2017  Creatinine 1.39 (H) 0.52 - 1.04 mg/dL Final 01/10/2017 10:49 AM 64   GFR Estimate 37 (L) >60 mL/min/1.7m2 Final 01/10/2017 10:49 AM 64   Comment:   Non  GFR Calc   GFR Estimate If Black 44 (L) >60 mL/min/1.7m2 Final 01/10/2017 10:49 AM 64   Comment:     Lab results 01/06/2016    Creatinine 1.20 (H) 0.52 - 1.04 mg/dL Final 01/06/2016  9:56    GFR Estimate 44 (L) >60 mL/min/1.7m2 Final 01/06/2016  9:56    GFR Estimate If Black 53 (L) >60 mL/min/1.7m2 Final 01/06/2016  9:56

## 2017-04-05 ENCOUNTER — TRANSFERRED RECORDS (OUTPATIENT)
Dept: HEALTH INFORMATION MANAGEMENT | Facility: CLINIC | Age: 77
End: 2017-04-05

## 2017-04-21 DIAGNOSIS — M79.7 FIBROMYALGIA: ICD-10-CM

## 2017-04-21 DIAGNOSIS — G89.4 CHRONIC PAIN SYNDROME: ICD-10-CM

## 2017-04-21 DIAGNOSIS — M48.061 LUMBAR SPINAL STENOSIS: ICD-10-CM

## 2017-04-21 RX ORDER — TRAMADOL HYDROCHLORIDE 50 MG/1
50 TABLET ORAL 3 TIMES DAILY
Qty: 270 TABLET | Refills: 3 | Status: SHIPPED | OUTPATIENT
Start: 2017-04-21 | End: 2017-04-25

## 2017-04-21 NOTE — TELEPHONE ENCOUNTER
Controlled Substance Refill Request for traMADol (ULTRAM) 50 MG tablet  Problem List Complete:  No     PROVIDER TO CONSIDER COMPLETION OF PROBLEM LIST AND OVERVIEW/CONTROLLED SUBSTANCE AGREEMENT    Last Written Prescription Date:  01/10/2017  Last Fill Quantity: 270,   # refills: 3    Last Office Visit with [facility] primary care provider: 01/10/2017    Future Office visit:     Controlled substance agreement on file: No.     Processing:  Fax Rx to [facility] pharmacy   checked in past 6 months?  Yes 04/21/2017     [place]  reviewed- no concerns    [place]  [email or web address]

## 2017-04-21 NOTE — TELEPHONE ENCOUNTER
Reason for Call:  Medication or medication refill:    Do you use a [facility]?  Name of the pharmacy and phone number for the current request:  [id number] - [place], [place] [address] [phone] (Phone)    Name of the medication requested: traMADol (ULTRAM) 50 MG tablet    Other request: Please fax to pharmacy and let patient know when you fax it.     Can we leave a detailed message on this number? YES    Phone number patient can be reached at: Home number on file [phone] (home)    Best Time: any    Call taken on 4/21/2017 at 3:58 PM by Salma Tolliver

## 2017-04-25 DIAGNOSIS — G89.4 CHRONIC PAIN SYNDROME: ICD-10-CM

## 2017-04-25 DIAGNOSIS — M48.061 LUMBAR SPINAL STENOSIS: ICD-10-CM

## 2017-04-25 DIAGNOSIS — M79.7 FIBROMYALGIA: ICD-10-CM

## 2017-04-25 RX ORDER — TRAMADOL HYDROCHLORIDE 50 MG/1
50 TABLET ORAL 3 TIMES DAILY
Qty: 270 TABLET | Refills: 3 | Status: SHIPPED | OUTPATIENT
Start: 2017-04-25 | End: 2017-11-08

## 2017-05-15 ENCOUNTER — TRANSFERRED RECORDS (OUTPATIENT)
Dept: HEALTH INFORMATION MANAGEMENT | Facility: CLINIC | Age: 77
End: 2017-05-15

## 2017-06-20 ENCOUNTER — TRANSFERRED RECORDS (OUTPATIENT)
Dept: HEALTH INFORMATION MANAGEMENT | Facility: CLINIC | Age: 77
End: 2017-06-20

## 2017-07-24 ENCOUNTER — TRANSFERRED RECORDS (OUTPATIENT)
Dept: HEALTH INFORMATION MANAGEMENT | Facility: CLINIC | Age: 77
End: 2017-07-24

## 2017-11-01 ENCOUNTER — TRANSFERRED RECORDS (OUTPATIENT)
Dept: HEALTH INFORMATION MANAGEMENT | Facility: CLINIC | Age: 77
End: 2017-11-01

## 2017-11-08 DIAGNOSIS — G89.4 CHRONIC PAIN SYNDROME: ICD-10-CM

## 2017-11-08 DIAGNOSIS — M79.7 FIBROMYALGIA: ICD-10-CM

## 2017-11-08 RX ORDER — TRAMADOL HYDROCHLORIDE 50 MG/1
50 TABLET ORAL 3 TIMES DAILY
Qty: 270 TABLET | Refills: 3 | Status: SHIPPED | OUTPATIENT
Start: 2017-11-08 | End: 2018-08-04

## 2017-11-08 NOTE — TELEPHONE ENCOUNTER
traMADol (ULTRAM) 50 MG tablet      Last Written Prescription Date:  4/25/17  Last Fill Quantity: 270,   # refills: 3  Future Office visit:       Routing refill request to provider for review/approval because:  Drug not on the [facility], [facility] or [facility] refill protocol or controlled substance

## 2017-11-29 ENCOUNTER — TRANSFERRED RECORDS (OUTPATIENT)
Dept: HEALTH INFORMATION MANAGEMENT | Facility: CLINIC | Age: 77
End: 2017-11-29

## 2017-12-04 ENCOUNTER — TRANSFERRED RECORDS (OUTPATIENT)
Dept: HEALTH INFORMATION MANAGEMENT | Facility: CLINIC | Age: 77
End: 2017-12-04

## 2017-12-12 ENCOUNTER — TRANSFERRED RECORDS (OUTPATIENT)
Dept: HEALTH INFORMATION MANAGEMENT | Facility: CLINIC | Age: 77
End: 2017-12-12

## 2018-01-17 ENCOUNTER — OFFICE VISIT (OUTPATIENT)
Dept: FAMILY MEDICINE | Facility: CLINIC | Age: 78
End: 2018-01-17
Payer: COMMERCIAL

## 2018-01-17 VITALS
DIASTOLIC BLOOD PRESSURE: 78 MMHG | HEART RATE: 92 BPM | OXYGEN SATURATION: 97 % | RESPIRATION RATE: 20 BRPM | HEIGHT: 61 IN | BODY MASS INDEX: 31.72 KG/M2 | TEMPERATURE: 98.5 F | WEIGHT: 168 LBS | SYSTOLIC BLOOD PRESSURE: 130 MMHG

## 2018-01-17 DIAGNOSIS — E53.8 VITAMIN B12 DEFICIENCY (NON ANEMIC): ICD-10-CM

## 2018-01-17 DIAGNOSIS — E03.9 HYPOTHYROIDISM, UNSPECIFIED TYPE: ICD-10-CM

## 2018-01-17 DIAGNOSIS — M79.7 FIBROMYALGIA: ICD-10-CM

## 2018-01-17 DIAGNOSIS — G89.4 CHRONIC PAIN SYNDROME: ICD-10-CM

## 2018-01-17 DIAGNOSIS — I10 HYPERTENSION GOAL BP (BLOOD PRESSURE) < 140/90: ICD-10-CM

## 2018-01-17 DIAGNOSIS — K21.9 GASTROESOPHAGEAL REFLUX DISEASE WITHOUT ESOPHAGITIS: ICD-10-CM

## 2018-01-17 DIAGNOSIS — Z23 NEED FOR PROPHYLACTIC VACCINATION AND INOCULATION AGAINST INFLUENZA: ICD-10-CM

## 2018-01-17 DIAGNOSIS — G47.33 OSA (OBSTRUCTIVE SLEEP APNEA): ICD-10-CM

## 2018-01-17 DIAGNOSIS — F41.1 GENERALIZED ANXIETY DISORDER: ICD-10-CM

## 2018-01-17 DIAGNOSIS — E78.5 HYPERLIPIDEMIA WITH TARGET LDL LESS THAN 100: ICD-10-CM

## 2018-01-17 DIAGNOSIS — Z00.00 ROUTINE GENERAL MEDICAL EXAMINATION AT A HEALTH CARE FACILITY: Primary | ICD-10-CM

## 2018-01-17 DIAGNOSIS — I87.2 CHRONIC VENOUS INSUFFICIENCY: ICD-10-CM

## 2018-01-17 DIAGNOSIS — D50.9 IRON DEFICIENCY ANEMIA, UNSPECIFIED IRON DEFICIENCY ANEMIA TYPE: ICD-10-CM

## 2018-01-17 DIAGNOSIS — R73.01 IMPAIRED FASTING GLUCOSE: ICD-10-CM

## 2018-01-17 DIAGNOSIS — D75.1 ERYTHROCYTOSIS: ICD-10-CM

## 2018-01-17 LAB
ALBUMIN SERPL-MCNC: 3.8 G/DL (ref 3.4–5)
ALP SERPL-CCNC: 88 U/L (ref 40–150)
ALT SERPL W P-5'-P-CCNC: 30 U/L (ref 0–50)
ANION GAP SERPL CALCULATED.3IONS-SCNC: 8 MMOL/L (ref 3–14)
AST SERPL W P-5'-P-CCNC: 50 U/L (ref 0–45)
BASOPHILS # BLD AUTO: 0 10E9/L (ref 0–0.2)
BASOPHILS NFR BLD AUTO: 0.6 %
BILIRUB SERPL-MCNC: 0.6 MG/DL (ref 0.2–1.3)
BUN SERPL-MCNC: 27 MG/DL (ref 7–30)
CALCIUM SERPL-MCNC: 8.9 MG/DL (ref 8.5–10.1)
CHLORIDE SERPL-SCNC: 101 MMOL/L (ref 94–109)
CHOLEST SERPL-MCNC: 161 MG/DL
CO2 SERPL-SCNC: 30 MMOL/L (ref 20–32)
CREAT SERPL-MCNC: 1.1 MG/DL (ref 0.52–1.04)
DIFFERENTIAL METHOD BLD: ABNORMAL
EOSINOPHIL # BLD AUTO: 0 10E9/L (ref 0–0.7)
EOSINOPHIL NFR BLD AUTO: 0 %
ERYTHROCYTE [DISTWIDTH] IN BLOOD BY AUTOMATED COUNT: 13.7 % (ref 10–15)
FERRITIN SERPL-MCNC: 58 NG/ML (ref 8–252)
GFR SERPL CREATININE-BSD FRML MDRD: 48 ML/MIN/1.7M2
GLUCOSE SERPL-MCNC: 98 MG/DL (ref 70–99)
HBA1C MFR BLD: 5.9 % (ref 4.3–6)
HCT VFR BLD AUTO: 52.7 % (ref 35–47)
HDLC SERPL-MCNC: 47 MG/DL
HGB BLD-MCNC: 17.3 G/DL (ref 11.7–15.7)
LDLC SERPL CALC-MCNC: 74 MG/DL
LYMPHOCYTES # BLD AUTO: 1.5 10E9/L (ref 0.8–5.3)
LYMPHOCYTES NFR BLD AUTO: 28.4 %
MCH RBC QN AUTO: 30 PG (ref 26.5–33)
MCHC RBC AUTO-ENTMCNC: 32.8 G/DL (ref 31.5–36.5)
MCV RBC AUTO: 91 FL (ref 78–100)
MONOCYTES # BLD AUTO: 0.6 10E9/L (ref 0–1.3)
MONOCYTES NFR BLD AUTO: 12.3 %
NEUTROPHILS # BLD AUTO: 3 10E9/L (ref 1.6–8.3)
NEUTROPHILS NFR BLD AUTO: 58.7 %
NONHDLC SERPL-MCNC: 114 MG/DL
PLATELET # BLD AUTO: 155 10E9/L (ref 150–450)
POTASSIUM SERPL-SCNC: 3.8 MMOL/L (ref 3.4–5.3)
PROT SERPL-MCNC: 7.4 G/DL (ref 6.8–8.8)
RBC # BLD AUTO: 5.77 10E12/L (ref 3.8–5.2)
SODIUM SERPL-SCNC: 139 MMOL/L (ref 133–144)
TRIGL SERPL-MCNC: 202 MG/DL
TSH SERPL DL<=0.005 MIU/L-ACNC: 0.72 MU/L (ref 0.4–4)
VIT B12 SERPL-MCNC: 4903 PG/ML (ref 193–986)
WBC # BLD AUTO: 5.1 10E9/L (ref 4–11)

## 2018-01-17 PROCEDURE — 80050 GENERAL HEALTH PANEL: CPT | Performed by: INTERNAL MEDICINE

## 2018-01-17 PROCEDURE — 36415 COLL VENOUS BLD VENIPUNCTURE: CPT | Performed by: INTERNAL MEDICINE

## 2018-01-17 PROCEDURE — 90662 IIV NO PRSV INCREASED AG IM: CPT | Performed by: INTERNAL MEDICINE

## 2018-01-17 PROCEDURE — 83036 HEMOGLOBIN GLYCOSYLATED A1C: CPT | Performed by: INTERNAL MEDICINE

## 2018-01-17 PROCEDURE — 99397 PER PM REEVAL EST PAT 65+ YR: CPT | Mod: 25 | Performed by: INTERNAL MEDICINE

## 2018-01-17 PROCEDURE — 80061 LIPID PANEL: CPT | Performed by: INTERNAL MEDICINE

## 2018-01-17 PROCEDURE — 82728 ASSAY OF FERRITIN: CPT | Performed by: INTERNAL MEDICINE

## 2018-01-17 PROCEDURE — G0008 ADMIN INFLUENZA VIRUS VAC: HCPCS | Performed by: INTERNAL MEDICINE

## 2018-01-17 PROCEDURE — 82607 VITAMIN B-12: CPT | Performed by: INTERNAL MEDICINE

## 2018-01-17 RX ORDER — SPIRONOLACTONE 25 MG/1
2 TABLET ORAL EVERY MORNING
COMMUNITY
Start: 2017-08-14 | End: 2018-01-17

## 2018-01-17 RX ORDER — SPIRONOLACTONE 25 MG/1
50 TABLET ORAL EVERY MORNING
Qty: 180 TABLET | Refills: 3 | Status: SHIPPED | OUTPATIENT
Start: 2018-01-17 | End: 2018-10-17

## 2018-01-17 RX ORDER — BUSPIRONE HYDROCHLORIDE 10 MG/1
10 TABLET ORAL 2 TIMES DAILY
Qty: 180 TABLET | Refills: 3 | Status: SHIPPED | OUTPATIENT
Start: 2018-01-17 | End: 2019-01-22

## 2018-01-17 RX ORDER — BUMETANIDE 1 MG/1
1 TABLET ORAL 2 TIMES DAILY
Qty: 180 TABLET | Refills: 3 | Status: SHIPPED | OUTPATIENT
Start: 2018-01-17 | End: 2019-01-22

## 2018-01-17 RX ORDER — METOPROLOL TARTRATE 50 MG
50 TABLET ORAL 2 TIMES DAILY
Qty: 180 TABLET | Refills: 3 | Status: SHIPPED | OUTPATIENT
Start: 2018-01-17 | End: 2019-01-22

## 2018-01-17 RX ORDER — ATORVASTATIN CALCIUM 40 MG/1
40 TABLET, FILM COATED ORAL DAILY
Qty: 90 TABLET | Refills: 3 | Status: SHIPPED | OUTPATIENT
Start: 2018-01-17 | End: 2019-01-22

## 2018-01-17 RX ORDER — WARFARIN SODIUM 5 MG/1
5 TABLET ORAL
COMMUNITY
Start: 2017-08-25 | End: 2019-01-22

## 2018-01-17 RX ORDER — ASCORBIC ACID 125 MG
TABLET,CHEWABLE ORAL
COMMUNITY
Start: 2018-01-17 | End: 2018-01-18

## 2018-01-17 RX ORDER — LEVOTHYROXINE SODIUM 137 UG/1
137 TABLET ORAL DAILY
Qty: 90 TABLET | Refills: 3 | Status: SHIPPED | OUTPATIENT
Start: 2018-01-17 | End: 2019-01-22

## 2018-01-17 RX ORDER — POTASSIUM CHLORIDE 750 MG/1
10 TABLET, EXTENDED RELEASE ORAL DAILY
Qty: 90 TABLET | Refills: 3 | Status: SHIPPED | OUTPATIENT
Start: 2018-01-17 | End: 2018-10-17

## 2018-01-17 ASSESSMENT — ACTIVITIES OF DAILY LIVING (ADL)
I_NEED_ASSISTANCE_FOR_THE_FOLLOWING_DAILY_ACTIVITIES:: NO ASSISTANCE IS NEEDED
CURRENT_FUNCTION: NO ASSISTANCE NEEDED

## 2018-01-17 NOTE — PROGRESS NOTES
"SUBJECTIVE:   Jennifer Torres is a 77 year old female who presents for Preventive Visit.      Are you in the first 12 months of your Medicare coverage?  No    Physical   Annual:     Getting at least 3 servings of Calcium per day::  Yes    Bi-annual eye exam::  Yes    Dental care twice a year::  Yes    Sleep apnea or symptoms of sleep apnea::  None    Diet::  Regular (no restrictions)    Taking medications regularly::  Yes    Medication side effects::  Not applicable and None    Additional concerns today::  No    Ability to successfully perform activities of daily living: no assistance needed  Home Safety:  No safety concerns identified  Hearing Impairment: need to ask people to speak up or repeat themselves        Fall risk:         COGNITIVE SCREEN  1) Repeat 3 items (Banana, Sunrise, Chair)    2) Clock draw: NORMAL  3) 3 item recall: Recalls 3 objects  Results: 3 items recalled: COGNITIVE IMPAIRMENT LESS LIKELY    Mini-CogTM Copyright S James. Licensed by the author for use in [facility]; reprinted with permission (iglesia@[facility]). All rights reserved.          Reviewed and updated as needed this visit by clinical staffTobacco  Allergies  Meds  Med Hx  Surg Hx  Fam Hx  Soc Hx        Reviewed and updated as needed this visit by Provider            Alcohol Use 1/17/2018   If you drink alcohol, do you typically have greater than 3 drinks per day OR greater than 7 drinks per week?   Not applicable              back on warfarin.             Taking bumex 1 mg bid.       Taking 1 potassium tablet per day.                Spironolactone 25 mg bid.             stopped taking trazodone.    Also stopped taking Fe; constipated.               Also stopped vitamin D.      Taking vit B12 , \"5,000 mcg per day\", \"per Cardiology.                     She is having more low back pain today.  She had an epidural steroid injection which was helpful, but then she tripped over her vacuum  2 days later and " strained her back.                                                     She continues with tramadol 50 mg 3 times daily, and this continues to be very helpful for her.                                         Apparently did not have a mammogram last year.         Today's PHQ-2 Score:   PHQ-2 ( 1999 [employer]) 1/17/2018   Q1: Little interest or pleasure in doing things 0   Q2: Feeling down, depressed or hopeless 0   PHQ-2 Score 0   Q1: Little interest or pleasure in doing things Not at all   Q2: Feeling down, depressed or hopeless Not at all   PHQ-2 Score 0       Do you feel safe in your environment - Yes    Do you have a Health Care Directive?: Yes: Patient states has Advance Directive and will bring in a copy to clinic.    Current providers sharing in care for this patient include:   Patient Care Team:  Rodney Narayanan MD as PCP - General (Internal Medicine)  Cheryl Estrada, RN as  (Family Medicine - Geriatric Medicine)    The following health maintenance items are reviewed in Epic and correct as of today:  Health Maintenance   Topic Date Due     URINE DRUG SCREEN Q1 YR  08/16/1955     NOEMÍ QUESTIONNAIRE 1 YEAR  08/16/1957     ADVANCE DIRECTIVE PLANNING Q5 YRS  08/16/1994     PHQ-9 Q1YR  07/10/2014     INFLUENZA VACCINE (SYSTEM ASSIGNED)  09/01/2017     FALL RISK ASSESSMENT  01/10/2018     LIPID SCREEN Q5 YR FEMALE (SYSTEM ASSIGNED)  01/10/2022     TETANUS IMMUNIZATION (SYSTEM ASSIGNED)  12/29/2024     DEXA SCAN SCREENING (SYSTEM ASSIGNED)  Completed     PNEUMOCOCCAL  Completed     Patient Active Problem List    Diagnosis Date Noted     PAF (paroxysmal atrial fibrillation) (H) 09/02/2016     Priority: High     Ablation and amiodarone and warfarin in 5/16; bradycardia in 8/16; pacemaker placed.              amio stopped in 10/16.     Warfarin stopped by Cardiology in 10/16, and restarted by them in 7/17         Chronic pain syndrome 01/13/2016     Priority: High      checked 04/21/2017  No controlled  substance agreement on file./       Grief reaction 2015     Priority: High     Son  age 53       Rib fractures 2015     Priority: High     Hypertension goal BP (blood pressure) < 140/90 2013     Priority: High     SOB (shortness of breath) 2013     Priority: High     See cardiology letter  and ; PFT's nml except some air trapping; pt reports MDI did not help       Lumbar spinal stenosis 2013     Priority: High     Impaired fasting glucose      Priority: High     , A1C 7.8 in ; add metformin.  , A1C down to 6.1 ; 115 and 6.3 in ; 82 in ;     110 and 6.3 in ; 107 and 6.0 in        Coronary atherosclerosis      Priority: High     LAD stent ;              See cardiology note 3/13; pt has ? Anginal sx; also severe DILLON; in ,  cor angio showed multiple 50% lesions           Hypothyroidism, unspecified type 2018     Priority: Medium     MENDEL (obstructive sleep apnea) 2018     Priority: Medium     Dx ?; is using CPAP       Routine general medical examination at a health care facility 01/10/2017     Priority: Medium     Hyperlipidemia with target LDL less than 100 2013     Priority: Medium     Diagnosis updated by automated process. Provider to review and confirm.       Generalized anxiety disorder 2013     Priority: Medium     Diagnosis updated by automated process. Provider to review and confirm.       Hypothyroidism 2013     Priority: Medium     Chronic low back pain 2013     Priority: Medium     PHQ-9 = 10 in        Spinal fusion failure (H) 2013     Priority: Medium     Iron deficiency anemia 2013     Priority: Medium     Took iron for 2 months late ; and in .           In , ferritin 12; in , up to 25 with Fe;  In , ferritin only 27 but Hgb up to 13.7; FIT neg in . Not taking Fe in ;  56 and 14;         43 and 13 in         25 and 13.2 in ; resume  "Fe QOD       Fibromyalgia      Priority: Medium     Sees Dr. Valverde; he retired 2012       Chronic venous insufficiency      Priority: Medium     Preventive measure 04/02/2013     Priority: Low     APRIMA DATA BASE UNDER THE 12/4/12 NOTE  Colonoscopy 6/05  FIT neg in 1/14        Colonoscopy neg in 2/16        Mammogram 1/16                    Bone density \"normal\"  2009; ERT 8916-3042           Health Care Home 10/03/2012     Priority: Low     Cheryl Estrada BS, RN, PHN  A    [phone]      DX V65.8 REPLACED WITH [facility] (04/08/2013)         Past Surgical History:   Procedure Laterality Date     APPENDECTOMY  1956     BACK SURGERY  9/12    lumbar spine fusion; Dr. Marvel BECKHAM TOTAL KNEE ARTHROPLASTY Bilateral 2004,2005     CARPAL TUNNEL RELEASE RT/LT Bilateral 1974     CHOLECYSTECTOMY  2001     HYSTERECTOMY  1977    and L oophorectomy     Social History     Social History     Marital status:      Spouse name: N/A     Number of children: 4     Years of education: N/A     Occupational History     Not on file.     Social History Main Topics     Smoking status: Never Smoker     Smokeless tobacco: Never Used     Alcohol use Yes      Comment: occ.     Drug use: No     Sexual activity: Yes     Partners: Male     Other Topics Concern     Parent/Sibling W/ Cabg, Mi Or Angioplasty Before 65f 55m? Yes     Social History Narrative     Immunization History   Administered Date(s) Administered     Influenza (High Dose) 3 valent vaccine 12/24/2013, 12/29/2014, 01/06/2016, 01/10/2017     Influenza (IIV3) PF 11/14/2011, 12/04/2012     Pneumo Conj 13-V (2010&after) 01/10/2017     Pneumococcal 23 valent 10/28/2009     TD (ADULT, 7+) 12/24/2004     TDAP Vaccine (Adacel) 12/29/2014       BP Readings from Last 3 Encounters:   01/17/18 130/78   01/10/17 128/78   09/06/16 124/64    Wt Readings from Last 3 Encounters:   01/17/18 168 lb (76.2 kg)   01/10/17 167 lb (75.8 kg)   09/06/16 168 lb " (76.2 kg)                  Current Outpatient Prescriptions   Medication Sig Dispense Refill     warfarin (COUMADIN) 5 MG tablet Take 5 mg by mouth       traMADol (ULTRAM) 50 MG tablet Take 1 tablet (50 mg) by mouth 3 times daily 270 tablet 3     potassium chloride (K-TAB,KLOR-CON) 10 MEQ tablet Take 1 tablet (10 mEq) by mouth 1 times daily 180 tablet 3     omeprazole (PRILOSEC) 20 MG CR capsule Take 1 capsule (20 mg) by mouth daily 90 capsule 3     levothyroxine (SYNTHROID/LEVOTHROID) 137 MCG tablet Take 1 tablet (137 mcg) by mouth daily 90 tablet 3     busPIRone (BUSPAR) 10 MG tablet Take 1 tablet (10 mg) by mouth 2 times daily 180 tablet 3     metoprolol (LOPRESSOR) 50 MG tablet Take 1 tablet (50 mg) by mouth 2 times daily 180 tablet 3             bumetanide (BUMEX) 1 MG tablet bid 180 tablet 3             Blood Glucose Monitoring Suppl (BLOOD GLUCOSE SYSTEM YARELY) KIT Dispense meter, test strips, lancets covered by pt ins. 250.00 NIDDM type II - Test 1 time/day       nitroglycerin (NITROSTAT) 0.4 MG SL tablet Place 0.4 mg under the tongue       aspirin 81 MG tablet Take 1 tablet by mouth daily       Multiple Vitamins-Minerals (MULTIVITAMIN OR) Take 1 tablet by mouth daily.       fish oil-omega-3 fatty acids (FISH OIL) 1000 MG capsule Take 1 g by mouth daily.       B12  5,000 mcg per day       spironolactone (ALDACTONE) 25 MG tablet Take 2 tablets by mouth every morning       atorvastatin (LIPITOR) 40 MG tablet Take 1 tablet (40 mg) by mouth daily 90 tablet 3     bisacodyl (DULCOLAX) 5 MG EC tablet Take 5-10 mg by mouth       No Known Allergies        Review of Systems  C: NEGATIVE for fever, chills, change in weight  I: NEGATIVE for worrisome rashes, moles or lesions  E: NEGATIVE for vision changes or irritation  E/M: NEGATIVE for ear, mouth and throat problems  R: NEGATIVE for significant cough or SOB  B: NEGATIVE for masses, tenderness or discharge  CV: NEGATIVE for chest pain, palpitations or peripheral  "edema  GI: NEGATIVE for nausea, abdominal pain, heartburn, or change in bowel habits  : NEGATIVE for frequency, dysuria, or hematuria  N: NEGATIVE for weakness, dizziness or paresthesias  E: NEGATIVE for temperature intolerance, skin/hair changes  H: NEGATIVE for bleeding problems  P: NEGATIVE for changes in mood or affect    OBJECTIVE:   /78 (BP Location: Left arm, Patient Position: Chair, Cuff Size: Adult Large)  Pulse 92  Temp 98.5  F (36.9  C)  Resp 20  Ht 5' 1\" (1.549 m)  Wt 168 lb (76.2 kg)  SpO2 97%  Breastfeeding? No  BMI 31.74 kg/m2 Estimated body mass index is 31.74 kg/(m^2) as calculated from the following:    Height as of this encounter: 5' 1\" (1.549 m).    Weight as of this encounter: 168 lb (76.2 kg).  Physical Exam  GENERAL APPEARANCE: alert, no distress and over weight  EYES: Eyes grossly normal to inspection  HENT: ear canals and TM's normal, nose and mouth without ulcers or lesions, oropharynx clear and oral mucous membranes moist  NECK: no adenopathy, no asymmetry, masses, or scars and thyroid normal to palpation  RESP: lungs clear to auscultation - no rales, rhonchi or wheezes  BREAST: normal without masses, tenderness or nipple discharge and no palpable axillary masses or adenopathy  CV: regular rate and rhythm, normal S1 S2, no S3 or S4, no murmur, click or rub, no peripheral edema and peripheral pulses strong  ABDOMEN: soft, nontender, no hepatosplenomegaly and no masses  MS: spine/back exam reveals mild kyphosis  SKIN: no suspicious lesions or rashes  NEURO: Normal strength and tone, mentation intact and speech normal  PSYCH: mentation appears normal and affect normal/bright    ASSESSMENT / PLAN:   Jennifer was seen today for physical.    Diagnoses and all orders for this visit:    Routine general medical examination at a health care facility    Chronic pain syndrome    Fibromyalgia    Chronic venous insufficiency  -     spironolactone (ALDACTONE) 25 MG tablet; Take 2 tablets " (50 mg) by mouth every morning  -     potassium chloride (K-TAB,KLOR-CON) 10 MEQ tablet; Take 1 tablet (10 mEq) by mouth daily    Generalized anxiety disorder  -     busPIRone (BUSPAR) 10 MG tablet; Take 1 tablet (10 mg) by mouth 2 times daily    Hypothyroidism, unspecified type  -     TSH with free T4 reflex  -     levothyroxine (SYNTHROID/LEVOTHROID) 137 MCG tablet; Take 1 tablet (137 mcg) by mouth daily    Impaired fasting glucose  -     Comprehensive metabolic panel (BMP + Alb, Alk Phos, ALT, AST, Total. Bili, TP)  -     Hemoglobin A1c    Iron deficiency anemia, unspecified iron deficiency anemia type  -     Ferritin  -     CBC with platelets and differential    Hypertension goal BP (blood pressure) < 140/90  -     bumetanide (BUMEX) 1 MG tablet; Take 1 tablet (1 mg) by mouth 2 times daily  -     spironolactone (ALDACTONE) 25 MG tablet; Take 2 tablets (50 mg) by mouth every morning  -     metoprolol tartrate (LOPRESSOR) 50 MG tablet; Take 1 tablet (50 mg) by mouth 2 times daily    Gastroesophageal reflux disease without esophagitis  -     omeprazole (PRILOSEC) 20 MG CR capsule; Take 1 capsule (20 mg) by mouth daily    Hyperlipidemia with target LDL less than 100  -     atorvastatin (LIPITOR) 40 MG tablet; Take 1 tablet (40 mg) by mouth daily  -     Lipid Profile (Chol, Trig, HDL, LDL calc)    Vitamin B12 deficiency (non anemic)  -     Vitamin B12    Need for prophylactic vaccination and inoculation against influenza    MENDEL (obstructive sleep apnea)  Comments:  Dx 2017?; is using CPAP                    Summary and implications:   overall stable.               Check labs and adjust medications as indicated,including Fe and K+ and B12.                           Ok to continue tramadol.     Patient Instructions     I will let you know your lab results.     Resume the vitamin D, 2,000 IU per day.     You are planning on having a mammogram.                       .      End of Life Planning:  Patient currently has an  "advanced directive: Yes.  Practitioner is supportive of decision.    COUNSELING:  Reviewed preventive health counseling, as reflected in patient instructions  Special attention given to:       Regular exercise       Healthy diet/nutrition        Estimated body mass index is 31.74 kg/(m^2) as calculated from the following:    Height as of this encounter: 5' 1\" (1.549 m).    Weight as of this encounter: 168 lb (76.2 kg).  Weight management plan: Discussed healthy diet and exercise guidelines and patient will follow up in 12 months in clinic to re-evaluate.   reports that she has never smoked. She has never used smokeless tobacco.        Appropriate preventive services were discussed with this patient, including applicable screening as appropriate for cardiovascular disease, diabetes, osteopenia/osteoporosis, and glaucoma.  As appropriate for age/gender, discussed screening for colorectal cancer, prostate cancer, breast cancer, and cervical cancer. Checklist reviewing preventive services available has been given to the patient.    Reviewed patients plan of care and provided an AVS. The Basic Care Plan (routine screening as documented in Health Maintenance) for Jennifer meets the Care Plan requirement. This Care Plan has been established and reviewed with the Patient.    Counseling Resources:  ATP IV Guidelines  Pooled Cohorts Equation Calculator  Breast Cancer Risk Calculator  FRAX Risk Assessment  ICSI Preventive Guidelines  Dietary Guidelines for Americans, 2010  USDA's MyPlate  ASA Prophylaxis  Lung CA Screening    Rodney Narayanan MD  [facility] XERXESAnswers for HPI/ROS submitted by the patient on 1/17/2018   PHQ-2 Score: 0     Results for orders placed or performed in visit on 01/17/18   Comprehensive metabolic panel (BMP + Alb, Alk Phos, ALT, AST, Total. Bili, TP)   Result Value Ref Range    Sodium 139 133 - 144 mmol/L    Potassium 3.8 3.4 - 5.3 mmol/L    Chloride 101 94 - 109 mmol/L    " Carbon Dioxide 30 20 - 32 mmol/L    Anion Gap 8 3 - 14 mmol/L    Glucose 98 70 - 99 mg/dL    Urea Nitrogen 27 7 - 30 mg/dL    Creatinine 1.10 (H) 0.52 - 1.04 mg/dL    GFR Estimate 48 (L) >60 mL/min/1.7m2    GFR Estimate If Black 58 (L) >60 mL/min/1.7m2    Calcium 8.9 8.5 - 10.1 mg/dL    Bilirubin Total 0.6 0.2 - 1.3 mg/dL    Albumin 3.8 3.4 - 5.0 g/dL    Protein Total 7.4 6.8 - 8.8 g/dL    Alkaline Phosphatase 88 40 - 150 U/L    ALT 30 0 - 50 U/L    AST 50 (H) 0 - 45 U/L   TSH with free T4 reflex   Result Value Ref Range    TSH 0.72 0.40 - 4.00 mU/L   Ferritin   Result Value Ref Range    Ferritin 58 8 - 252 ng/mL   CBC with platelets and differential   Result Value Ref Range    WBC 5.1 4.0 - 11.0 10e9/L    RBC Count 5.77 (H) 3.8 - 5.2 10e12/L    Hemoglobin 17.3 (H) 11.7 - 15.7 g/dL    Hematocrit 52.7 (H) 35.0 - 47.0 %    MCV 91 78 - 100 fl    MCH 30.0 26.5 - 33.0 pg    MCHC 32.8 31.5 - 36.5 g/dL    RDW 13.7 10.0 - 15.0 %    Platelet Count 155 150 - 450 10e9/L    Diff Method Automated Method     % Neutrophils 58.7 %    % Lymphocytes 28.4 %    % Monocytes 12.3 %    % Eosinophils 0.0 %    % Basophils 0.6 %    Absolute Neutrophil 3.0 1.6 - 8.3 10e9/L    Absolute Lymphocytes 1.5 0.8 - 5.3 10e9/L    Absolute Monocytes 0.6 0.0 - 1.3 10e9/L    Absolute Eosinophils 0.0 0.0 - 0.7 10e9/L    Absolute Basophils 0.0 0.0 - 0.2 10e9/L   Hemoglobin A1c   Result Value Ref Range    Hemoglobin A1C 5.9 4.3 - 6.0 %   Lipid Profile (Chol, Trig, HDL, LDL calc)   Result Value Ref Range    Cholesterol 161 <200 mg/dL    Triglycerides 202 (H) <150 mg/dL    HDL Cholesterol 47 (L) >49 mg/dL    LDL Cholesterol Calculated 74 <100 mg/dL    Non HDL Cholesterol 114 <130 mg/dL   Vitamin B12   Result Value Ref Range    Vitamin B12 4903 (H) 193 - 986 pg/mL     Letter and My chart message sent.                 Most of your lab results are normal or stable,including the liver,kidney,thyroid,glucose,cholesterol,potassium,and the iron level.            The  B12 level is too high.    You should get some 500 mcg tablets and take one per day. (you mentioned you are currently taking 5,000 mcg tablets).                    Also, the hemoglobin is high at 17.          We should recheck some of these labs again in 3-6 months.              See you then.

## 2018-01-17 NOTE — PATIENT INSTRUCTIONS
I will let you know your lab results.     Resume the vitamin D, 2,000 IU per day.     You are planning on having a mammogram.          Have a good year!

## 2018-01-17 NOTE — LETTER
January 18, 2018      Jennifer M Brian  [id number] [facility] [address]        Dear ,    We are writing to inform you of your test results.           Most of your lab results are normal or stable,including the liver,kidney,thyroid,glucose,cholesterol,potassium,and the iron level.                                                  The B12 level is too high.    You should get some 500 mcg tablets and take one per day. (you mentioned you are currently taking 5,000 mcg tablets).                                                                 Also, the hemoglobin is high at 17.          We should recheck some of these labs again in 3-6 months.              See you then.       Resulted Orders   Comprehensive metabolic panel (BMP + Alb, Alk Phos, ALT, AST, Total. Bili, TP)   Result Value Ref Range    Sodium 139 133 - 144 mmol/L    Potassium 3.8 3.4 - 5.3 mmol/L    Chloride 101 94 - 109 mmol/L    Carbon Dioxide 30 20 - 32 mmol/L    Anion Gap 8 3 - 14 mmol/L    Glucose 98 70 - 99 mg/dL      Comment:      Fasting specimen    Urea Nitrogen 27 7 - 30 mg/dL    Creatinine 1.10 (H) 0.52 - 1.04 mg/dL    GFR Estimate 48 (L) >60 mL/min/1.7m2      Comment:      Non  GFR Calc    GFR Estimate If Black 58 (L) >60 mL/min/1.7m2      Comment:       GFR Calc    Calcium 8.9 8.5 - 10.1 mg/dL    Bilirubin Total 0.6 0.2 - 1.3 mg/dL    Albumin 3.8 3.4 - 5.0 g/dL    Protein Total 7.4 6.8 - 8.8 g/dL    Alkaline Phosphatase 88 40 - 150 U/L    ALT 30 0 - 50 U/L    AST 50 (H) 0 - 45 U/L   TSH with free T4 reflex   Result Value Ref Range    TSH 0.72 0.40 - 4.00 mU/L   Ferritin   Result Value Ref Range    Ferritin 58 8 - 252 ng/mL   CBC with platelets and differential   Result Value Ref Range    WBC 5.1 4.0 - 11.0 10e9/L    RBC Count 5.77 (H) 3.8 - 5.2 10e12/L    Hemoglobin 17.3 (H) 11.7 - 15.7 g/dL    Hematocrit 52.7 (H) 35.0 - 47.0 %    MCV 91 78 - 100 fl    MCH 30.0 26.5 - 33.0 pg    MCHC 32.8  31.5 - 36.5 g/dL    RDW 13.7 10.0 - 15.0 %    Platelet Count 155 150 - 450 10e9/L    Diff Method Automated Method     % Neutrophils 58.7 %    % Lymphocytes 28.4 %    % Monocytes 12.3 %    % Eosinophils 0.0 %    % Basophils 0.6 %    Absolute Neutrophil 3.0 1.6 - 8.3 10e9/L    Absolute Lymphocytes 1.5 0.8 - 5.3 10e9/L    Absolute Monocytes 0.6 0.0 - 1.3 10e9/L    Absolute Eosinophils 0.0 0.0 - 0.7 10e9/L    Absolute Basophils 0.0 0.0 - 0.2 10e9/L   Hemoglobin A1c   Result Value Ref Range    Hemoglobin A1C 5.9 4.3 - 6.0 %   Lipid Profile (Chol, Trig, HDL, LDL calc)   Result Value Ref Range    Cholesterol 161 <200 mg/dL    Triglycerides 202 (H) <150 mg/dL      Comment:      Borderline high:  150-199 mg/dl  High:             200-499 mg/dl  Very high:       >499 mg/dl  Fasting specimen      HDL Cholesterol 47 (L) >49 mg/dL    LDL Cholesterol Calculated 74 <100 mg/dL      Comment:      Desirable:       <100 mg/dl    Non HDL Cholesterol 114 <130 mg/dL   Vitamin B12   Result Value Ref Range    Vitamin B12 4903 (H) 193 - 986 pg/mL       If you have any questions or concerns, please call the clinic at the number listed above.       Sincerely,        Rodney Narayanan MD

## 2018-01-17 NOTE — NURSING NOTE
"Chief Complaint   Patient presents with     Physical       Initial /78 (BP Location: Left arm, Patient Position: Chair, Cuff Size: Adult Large)  Pulse 92  Temp 98.5  F (36.9  C)  Resp 20  Ht 5' 1\" (1.549 m)  Wt 168 lb (76.2 kg)  SpO2 97%  Breastfeeding? No  BMI 31.74 kg/m2 Estimated body mass index is 31.74 kg/(m^2) as calculated from the following:    Height as of this encounter: 5' 1\" (1.549 m).    Weight as of this encounter: 168 lb (76.2 kg).  Medication Reconciliation: complete   Shanna Galdamez LPN  "

## 2018-01-17 NOTE — NURSING NOTE
Prior to injection verified patient identity using patient's name and date of birth.  Shanna Galdamez LPN

## 2018-01-17 NOTE — PROGRESS NOTES

## 2018-01-17 NOTE — MR AVS SNAPSHOT
After Visit Summary   1/17/2018    Jennifer Torres    MRN: [id number]           Patient Information     Date Of Birth          1940        Visit Information        Provider Department      1/17/2018 9:30 AM Rodney Narayanan MD [facility]        Today's Diagnoses     Routine general medical examination at a health care facility    -  1    Chronic pain syndrome        Fibromyalgia        Chronic venous insufficiency        Generalized anxiety disorder        Hypothyroidism, unspecified type        Impaired fasting glucose        Iron deficiency anemia, unspecified iron deficiency anemia type        Hypertension goal BP (blood pressure) < 140/90        Gastroesophageal reflux disease without esophagitis        Hyperlipidemia with target LDL less than 100        Vitamin B12 deficiency (non anemic)        Need for prophylactic vaccination and inoculation against influenza          Care Instructions      I will let you know your lab results.     Resume the vitamin D, 2,000 IU per day.             Have a good year!                       Follow-ups after your visit        Who to contact     If you have questions or need follow up information about today's clinic visit or your schedule please contact [facility] directly at [phone].  Normal or non-critical lab and imaging results will be communicated to you by MyChart, letter or phone within 4 business days after the clinic has received the results. If you do not hear from us within 7 days, please contact the clinic through MyChart or phone. If you have a critical or abnormal lab result, we will notify you by phone as soon as possible.  Submit refill requests through [employer] or call your pharmacy and they will forward the refill request to us. Please allow 3 business days for your refill to be completed.          Additional Information About Your Visit        MyChart Information     [employer]  "gives you secure access to your electronic health record. If you see a primary care provider, you can also send messages to your care team and make appointments. If you have questions, please call your primary care clinic.  If you do not have a primary care provider, please call [phone] and they will assist you.        Care EveryWhere ID     This is your Care EveryWhere ID. This could be used by other organizations to access your [facility] medical records  [id number]        Your Vitals Were     Pulse Temperature Respirations Height Pulse Oximetry Breastfeeding?    92 98.5  F (36.9  C) 20 5' 1\" (1.549 m) 97% No    BMI (Body Mass Index)                   31.74 kg/m2            Blood Pressure from Last 3 Encounters:   01/17/18 130/78   01/10/17 128/78   09/06/16 124/64    Weight from Last 3 Encounters:   01/17/18 168 lb (76.2 kg)   01/10/17 167 lb (75.8 kg)   09/06/16 168 lb (76.2 kg)              We Performed the Following     CBC with platelets and differential     Comprehensive metabolic panel (BMP + Alb, Alk Phos, ALT, AST, Total. Bili, TP)     Ferritin     Hemoglobin A1c     Lipid Profile (Chol, Trig, HDL, LDL calc)     TSH with free T4 reflex     Vitamin B12          Today's Medication Changes          These changes are accurate as of: 1/17/18 10:13 AM.  If you have any questions, ask your nurse or doctor.               These medicines have changed or have updated prescriptions.        Dose/Directions    bumetanide 1 MG tablet   Commonly known as:  BUMEX   This may have changed:    - how much to take  - how to take this  - when to take this  - additional instructions   Used for:  Hypertension goal BP (blood pressure) < 140/90   Changed by:  Rodney Narayanan MD        Dose:  1 mg   Take 1 tablet (1 mg) by mouth 2 times daily   Quantity:  180 tablet   Refills:  3       potassium chloride 10 MEQ tablet   Commonly known as:  K-TAB,KLOR-CON   This may have changed:  when to take this   Used for:  Chronic venous " insufficiency   Changed by:  Rodney Narayanan MD        Dose:  10 mEq   Take 1 tablet (10 mEq) by mouth daily   Quantity:  90 tablet   Refills:  3         Stop taking these medicines if you haven't already. Please contact your care team if you have questions.     traZODone 50 MG tablet   Commonly known as:  DESYREL   Stopped by:  Rodney Narayanan MD                Where to get your medicines      These medications were sent to [id number] - [place], [place] - [address] [address]  [address] [address] [facility] [address]    Hours:  test fax successful 4/5/04 kr Phone:  [phone]     atorvastatin 40 MG tablet    bumetanide 1 MG tablet    busPIRone 10 MG tablet    levothyroxine 137 MCG tablet    metoprolol tartrate 50 MG tablet    omeprazole 20 MG CR capsule    potassium chloride 10 MEQ tablet    spironolactone 25 MG tablet                Primary Care Provider Office Phone # Fax #    Rodney Narayanan [phone]       [address] [address]        Equal Access to Services     [facility]: Hadii aad ku hadasho Soomaali, waaxda luqadaha, qaybta kaalmada adeegyada, waxay lynettein haywei tavera . So [facility] [phone].    ATENCIÓN: Si habla español, tiene a garcia disposición servicios gratLea Regional Medical Centeros de asistencia lingüística. [facility] [phone].    We comply with applicable federal civil rights laws and [place] laws. We do not discriminate on the basis of race, color, national origin, age, disability, sex, sexual orientation, or gender identity.            Thank you!     Thank you for choosing [facility]  for your care. Our goal is always to provide you with excellent care. Hearing back from our patients is one way we can continue to improve our services. Please take a few minutes to complete the written survey that you may receive in the mail after your visit with us. Thank you!             Your Updated Medication List - Protect  others around you: Learn how to safely use, store and throw away your medicines at www.disposemymeds.org.          This list is accurate as of: 1/17/18 10:13 AM.  Always use your most recent med list.                   Brand Name Dispense Instructions for use Diagnosis    aspirin 81 MG tablet      Take 1 tablet by mouth daily        atorvastatin 40 MG tablet    LIPITOR    90 tablet    Take 1 tablet (40 mg) by mouth daily    Hyperlipidemia with target LDL less than 100       Blood Glucose System Aung Kit      Dispense meter, test strips, lancets covered by pt ins. 250.00 NIDDM type II - Test 1 time/day        bumetanide 1 MG tablet    BUMEX    180 tablet    Take 1 tablet (1 mg) by mouth 2 times daily    Hypertension goal BP (blood pressure) < 140/90       busPIRone 10 MG tablet    BUSPAR    180 tablet    Take 1 tablet (10 mg) by mouth 2 times daily    Generalized anxiety disorder       DULCOLAX 5 MG EC tablet   Generic drug:  bisacodyl      Take 5-10 mg by mouth        fish oil-omega-3 fatty acids 1000 MG capsule      Take 1 g by mouth daily.        levothyroxine 137 MCG tablet    SYNTHROID/LEVOTHROID    90 tablet    Take 1 tablet (137 mcg) by mouth daily    Hypothyroidism, unspecified type       metoprolol tartrate 50 MG tablet    LOPRESSOR    180 tablet    Take 1 tablet (50 mg) by mouth 2 times daily    Hypertension goal BP (blood pressure) < 140/90       MULTIVITAMIN PO      Take 1 tablet by mouth daily.        NITROSTAT 0.4 MG sublingual tablet   Generic drug:  nitroGLYcerin      Place 0.4 mg under the tongue        omeprazole 20 MG CR capsule    priLOSEC    90 capsule    Take 1 capsule (20 mg) by mouth daily    Gastroesophageal reflux disease without esophagitis       potassium chloride 10 MEQ tablet    K-TAB,KLOR-CON    90 tablet    Take 1 tablet (10 mEq) by mouth daily    Chronic venous insufficiency       spironolactone 25 MG tablet    ALDACTONE    180 tablet    Take 2 tablets (50 mg) by mouth every morning     Chronic venous insufficiency, Hypertension goal BP (blood pressure) < 140/90       traMADol 50 MG tablet    ULTRAM    270 tablet    Take 1 tablet (50 mg) by mouth 3 times daily    Fibromyalgia, Chronic pain syndrome       vitamin D 2000 UNITS Caps      Take 2 capsules by mouth daily.        warfarin 5 MG tablet    COUMADIN     Take 5 mg by mouth

## 2018-01-18 DIAGNOSIS — Z53.9 ERRONEOUS ENCOUNTER--DISREGARD: Primary | ICD-10-CM

## 2018-01-18 PROBLEM — D75.1 ERYTHROCYTOSIS: Status: ACTIVE | Noted: 2018-01-18

## 2018-01-18 RX ORDER — CYANOCOBALAMIN (VITAMIN B-12) 500 MCG
TABLET ORAL
Qty: 150 TABLET | COMMUNITY
Start: 2018-01-18

## 2018-01-22 ENCOUNTER — TRANSFERRED RECORDS (OUTPATIENT)
Dept: HEALTH INFORMATION MANAGEMENT | Facility: CLINIC | Age: 78
End: 2018-01-22

## 2018-01-31 ENCOUNTER — TRANSFERRED RECORDS (OUTPATIENT)
Dept: HEALTH INFORMATION MANAGEMENT | Facility: CLINIC | Age: 78
End: 2018-01-31

## 2018-03-24 ENCOUNTER — TELEPHONE (OUTPATIENT)
Dept: FAMILY MEDICINE | Facility: CLINIC | Age: 78
End: 2018-03-24

## 2018-03-24 NOTE — TELEPHONE ENCOUNTER
I called and discussed with Pt.  She used a fleets enema this AM again, got small results, watery.  She is using Miralax twice daily, senocot.   Advised to try dulcolax suppository and maybe one more Fleets enema but if no better results then she needs to go to ER to be seen.  Pt feels gassy, bloated but denies nausea or any vomiting

## 2018-03-24 NOTE — TELEPHONE ENCOUNTER
Reason for call:  Patient reporting a symptom    Symptom or request: Constipation    Duration (how long have symptoms been present): Week and 1/2    Have you been treated for this before? No    Additional comments: Pt says she has gone through 1/2 box exlax 4 fleets enema, senecot and other  remedies.  Offered appt but pt is afraid to go out in case meds start working.    Phone Number patient can be reached at:  Home number on file [phone] (home)    Best Time:  any    Can we leave a detailed message on this number:  YES    Call taken on 3/24/2018 at 9:30 AM by KAREN BRIGGS

## 2018-07-27 ENCOUNTER — TRANSFERRED RECORDS (OUTPATIENT)
Dept: HEALTH INFORMATION MANAGEMENT | Facility: CLINIC | Age: 78
End: 2018-07-27

## 2018-08-04 DIAGNOSIS — M79.7 FIBROMYALGIA: ICD-10-CM

## 2018-08-04 DIAGNOSIS — G89.4 CHRONIC PAIN SYNDROME: ICD-10-CM

## 2018-08-04 DIAGNOSIS — F41.1 GENERALIZED ANXIETY DISORDER: ICD-10-CM

## 2018-08-06 NOTE — TELEPHONE ENCOUNTER
Requested Prescriptions   Pending Prescriptions Disp Refills     traMADol (ULTRAM) 50 MG tablet [Pharmacy Med Name: TraMADol HCl Oral Tablet 50 MG]      Last Written Prescription Date:  11/8/17  Last Fill Quantity: 270,   # refills: 3  Last Office Visit: 1/17/18 [place]  Future Office visit:       Routing refill request to provider for review/approval because:  Drug not on the [facility], P or [facility] refill protocol or controlled substance   270 tablet 2     Sig: TAKE 1 TABLET BY MOUTH 3 TIMES DAILY    There is no refill protocol information for this order

## 2018-08-07 RX ORDER — TRAMADOL HYDROCHLORIDE 50 MG/1
TABLET ORAL
Qty: 270 TABLET | Refills: 2 | Status: SHIPPED | OUTPATIENT
Start: 2018-08-07 | End: 2018-10-17

## 2018-08-07 NOTE — TELEPHONE ENCOUNTER
Prescription faxed to [facility] to # [phone] today, and patient aware, called at home #.  Shanna Galdamez LPN

## 2018-08-07 NOTE — TELEPHONE ENCOUNTER
Controlled Substance Refill Request for traMADol (ULTRAM) 50 MG tablet  Problem List Complete:  No     PROVIDER TO CONSIDER COMPLETION OF PROBLEM LIST AND OVERVIEW/CONTROLLED SUBSTANCE AGREEMENT    Controlled substance agreement on file: No.     Processing:  Fax Rx to [facility]   checked in past 3 months?  No, route to RN     [place] reviewed:  reviewed- recommend provider review    7/26/18 Hydrocodone acetamin 5-325 from [facility] in [place].     [place]:  [email or web address]

## 2018-10-01 ENCOUNTER — TELEPHONE (OUTPATIENT)
Dept: FAMILY MEDICINE | Facility: CLINIC | Age: 78
End: 2018-10-01

## 2018-10-01 DIAGNOSIS — D75.1 ERYTHROCYTOSIS: Primary | ICD-10-CM

## 2018-10-01 DIAGNOSIS — R74.8 ABNORMAL AST AND ALT: ICD-10-CM

## 2018-10-01 NOTE — TELEPHONE ENCOUNTER
Most of your lab results are normal or stable,including the liver,kidney,thyroid,glucose,cholesterol,potassium,and the iron level.            The B12 level is too high.    You should get some 500 mcg tablets and take one per day. (you mentioned you are currently taking 5,000 mcg tablets).                    Also, the hemoglobin is high at 17.          We should recheck some of these labs again in 3-6 months.              See you then.     Routing to provider, which labs do you want to recheck on this patient?

## 2018-10-01 NOTE — TELEPHONE ENCOUNTER
Reason for Call: Request for an order or referral:    Order or referral being requested:   Patient is requesting orders in her chart as mentioned in letter from Rodney Narayanan   She had a January physical this year  Date needed: at your convenience    Has the patient been seen by the PCP for this problem? YES    Additional comments:  Please call patient to let her know when orders are in her chart  Phone number Patient can be reached at:  Home number on file [phone] (home)    Best Time:  anytime    Can we leave a detailed message on this number?  YES    Call taken on 10/1/2018 at 10:16 AM by ESTEFANÍA EARL

## 2018-10-04 DIAGNOSIS — D75.1 ERYTHROCYTOSIS: ICD-10-CM

## 2018-10-04 DIAGNOSIS — R74.8 ABNORMAL AST AND ALT: ICD-10-CM

## 2018-10-04 LAB
BASOPHILS # BLD AUTO: 0.1 10E9/L (ref 0–0.2)
BASOPHILS NFR BLD AUTO: 0.7 %
DIFFERENTIAL METHOD BLD: NORMAL
EOSINOPHIL # BLD AUTO: 0 10E9/L (ref 0–0.7)
EOSINOPHIL NFR BLD AUTO: 0 %
ERYTHROCYTE [DISTWIDTH] IN BLOOD BY AUTOMATED COUNT: 13.8 % (ref 10–15)
HCT VFR BLD AUTO: 37.4 % (ref 35–47)
HGB BLD-MCNC: 12.3 G/DL (ref 11.7–15.7)
LYMPHOCYTES # BLD AUTO: 1.9 10E9/L (ref 0.8–5.3)
LYMPHOCYTES NFR BLD AUTO: 25.1 %
MCH RBC QN AUTO: 30.8 PG (ref 26.5–33)
MCHC RBC AUTO-ENTMCNC: 32.9 G/DL (ref 31.5–36.5)
MCV RBC AUTO: 94 FL (ref 78–100)
MONOCYTES # BLD AUTO: 1 10E9/L (ref 0–1.3)
MONOCYTES NFR BLD AUTO: 13.1 %
NEUTROPHILS # BLD AUTO: 4.6 10E9/L (ref 1.6–8.3)
NEUTROPHILS NFR BLD AUTO: 61.1 %
PLATELET # BLD AUTO: 235 10E9/L (ref 150–450)
RBC # BLD AUTO: 4 10E12/L (ref 3.8–5.2)
WBC # BLD AUTO: 7.5 10E9/L (ref 4–11)

## 2018-10-04 PROCEDURE — 85025 COMPLETE CBC W/AUTO DIFF WBC: CPT | Performed by: INTERNAL MEDICINE

## 2018-10-04 PROCEDURE — 82728 ASSAY OF FERRITIN: CPT | Performed by: INTERNAL MEDICINE

## 2018-10-04 PROCEDURE — 80053 COMPREHEN METABOLIC PANEL: CPT | Performed by: INTERNAL MEDICINE

## 2018-10-04 PROCEDURE — 36415 COLL VENOUS BLD VENIPUNCTURE: CPT | Performed by: INTERNAL MEDICINE

## 2018-10-04 NOTE — LETTER
October 5, 2018      Jennifer Torres  [address] [address] [address]        Dear ,    We are writing to inform you of your test results.              Now you are a bit anemic,and your kidney function is a bit worse.            Please make an appointment with me within the next few weeks for further evaluation.                Resulted Orders   Comprehensive metabolic panel (BMP + Alb, Alk Phos, ALT, AST, Total. Bili, TP)   Result Value Ref Range    Sodium 138 133 - 144 mmol/L    Potassium 4.7 3.4 - 5.3 mmol/L    Chloride 101 94 - 109 mmol/L    Carbon Dioxide 31 20 - 32 mmol/L    Anion Gap 6 3 - 14 mmol/L    Glucose 88 70 - 99 mg/dL    Urea Nitrogen 34 (H) 7 - 30 mg/dL    Creatinine 1.41 (H) 0.52 - 1.04 mg/dL    GFR Estimate 36 (L) >60 mL/min/1.7m2      Comment:      Non  GFR Calc    GFR Estimate If Black 44 (L) >60 mL/min/1.7m2      Comment:       GFR Calc    Calcium 8.6 8.5 - 10.1 mg/dL    Bilirubin Total 0.5 0.2 - 1.3 mg/dL    Albumin 3.8 3.4 - 5.0 g/dL    Protein Total 7.5 6.8 - 8.8 g/dL    Alkaline Phosphatase 81 40 - 150 U/L    ALT 32 0 - 50 U/L    AST 45 0 - 45 U/L   CBC with platelets and differential   Result Value Ref Range    WBC 7.5 4.0 - 11.0 10e9/L    RBC Count 4.00 3.8 - 5.2 10e12/L    Hemoglobin 12.3 11.7 - 15.7 g/dL    Hematocrit 37.4 35.0 - 47.0 %    MCV 94 78 - 100 fl    MCH 30.8 26.5 - 33.0 pg    MCHC 32.9 31.5 - 36.5 g/dL    RDW 13.8 10.0 - 15.0 %    Platelet Count 235 150 - 450 10e9/L    % Neutrophils 61.1 %    % Lymphocytes 25.1 %    % Monocytes 13.1 %    % Eosinophils 0.0 %    % Basophils 0.7 %    Absolute Neutrophil 4.6 1.6 - 8.3 10e9/L    Absolute Lymphocytes 1.9 0.8 - 5.3 10e9/L    Absolute Monocytes 1.0 0.0 - 1.3 10e9/L    Absolute Eosinophils 0.0 0.0 - 0.7 10e9/L    Absolute Basophils 0.1 0.0 - 0.2 10e9/L    Diff Method Automated Method    Ferritin   Result Value Ref Range    Ferritin 67 8 - 252 ng/mL       If you have any  questions or concerns, please call the clinic at the number listed above.       Sincerely,        Rodney Narayanan MD

## 2018-10-05 LAB
ALBUMIN SERPL-MCNC: 3.8 G/DL (ref 3.4–5)
ALP SERPL-CCNC: 81 U/L (ref 40–150)
ALT SERPL W P-5'-P-CCNC: 32 U/L (ref 0–50)
ANION GAP SERPL CALCULATED.3IONS-SCNC: 6 MMOL/L (ref 3–14)
AST SERPL W P-5'-P-CCNC: 45 U/L (ref 0–45)
BILIRUB SERPL-MCNC: 0.5 MG/DL (ref 0.2–1.3)
BUN SERPL-MCNC: 34 MG/DL (ref 7–30)
CALCIUM SERPL-MCNC: 8.6 MG/DL (ref 8.5–10.1)
CHLORIDE SERPL-SCNC: 101 MMOL/L (ref 94–109)
CO2 SERPL-SCNC: 31 MMOL/L (ref 20–32)
CREAT SERPL-MCNC: 1.41 MG/DL (ref 0.52–1.04)
FERRITIN SERPL-MCNC: 67 NG/ML (ref 8–252)
GFR SERPL CREATININE-BSD FRML MDRD: 36 ML/MIN/1.7M2
GLUCOSE SERPL-MCNC: 88 MG/DL (ref 70–99)
POTASSIUM SERPL-SCNC: 4.7 MMOL/L (ref 3.4–5.3)
PROT SERPL-MCNC: 7.5 G/DL (ref 6.8–8.8)
SODIUM SERPL-SCNC: 138 MMOL/L (ref 133–144)

## 2018-10-17 ENCOUNTER — OFFICE VISIT (OUTPATIENT)
Dept: FAMILY MEDICINE | Facility: CLINIC | Age: 78
End: 2018-10-17
Payer: COMMERCIAL

## 2018-10-17 VITALS
BODY MASS INDEX: 31.15 KG/M2 | HEART RATE: 88 BPM | TEMPERATURE: 97.5 F | HEIGHT: 61 IN | RESPIRATION RATE: 20 BRPM | OXYGEN SATURATION: 98 % | WEIGHT: 165 LBS | DIASTOLIC BLOOD PRESSURE: 64 MMHG | SYSTOLIC BLOOD PRESSURE: 126 MMHG

## 2018-10-17 DIAGNOSIS — M79.7 FIBROMYALGIA: ICD-10-CM

## 2018-10-17 DIAGNOSIS — Z79.1 NSAID LONG-TERM USE: ICD-10-CM

## 2018-10-17 DIAGNOSIS — I87.2 CHRONIC VENOUS INSUFFICIENCY: ICD-10-CM

## 2018-10-17 DIAGNOSIS — I10 HYPERTENSION GOAL BP (BLOOD PRESSURE) < 140/90: ICD-10-CM

## 2018-10-17 DIAGNOSIS — G89.4 CHRONIC PAIN SYNDROME: ICD-10-CM

## 2018-10-17 DIAGNOSIS — Z23 NEED FOR PROPHYLACTIC VACCINATION AND INOCULATION AGAINST INFLUENZA: ICD-10-CM

## 2018-10-17 DIAGNOSIS — D64.9 ANEMIA, UNSPECIFIED TYPE: Primary | ICD-10-CM

## 2018-10-17 DIAGNOSIS — N28.9 RENAL INSUFFICIENCY: ICD-10-CM

## 2018-10-17 PROCEDURE — G0008 ADMIN INFLUENZA VIRUS VAC: HCPCS | Performed by: INTERNAL MEDICINE

## 2018-10-17 PROCEDURE — 90662 IIV NO PRSV INCREASED AG IM: CPT | Performed by: INTERNAL MEDICINE

## 2018-10-17 PROCEDURE — 99214 OFFICE O/P EST MOD 30 MIN: CPT | Mod: 25 | Performed by: INTERNAL MEDICINE

## 2018-10-17 RX ORDER — SPIRONOLACTONE 25 MG/1
25 TABLET ORAL DAILY
Qty: 90 TABLET | Refills: 3 | COMMUNITY
Start: 2018-10-17 | End: 2019-01-22

## 2018-10-17 RX ORDER — TRAMADOL HYDROCHLORIDE 50 MG/1
TABLET ORAL
Qty: 270 TABLET | Refills: 2 | Status: SHIPPED | OUTPATIENT
Start: 2018-10-17 | End: 2019-01-22

## 2018-10-17 NOTE — NURSING NOTE
Prior to injection verified patient identity using patient's name and date of birth.  Due to injection administration, patient instructed to remain in clinic for 15 minutes  afterwards, and to report any adverse reaction to me immediately.  Shanna Galdamez LPN

## 2018-10-17 NOTE — PATIENT INSTRUCTIONS
You should stop taking the potassium tablets.                  You should minimize your ibuprofen use.       Keep taking the spironolactone,25 mg per day.

## 2018-10-17 NOTE — MR AVS SNAPSHOT
After Visit Summary   10/17/2018    Jennifer Torres    MRN: [id number]           Patient Information     Date Of Birth          1940        Visit Information        Provider Department      10/17/2018 11:15 AM Rodeny Narayanan MD [facility]        Today's Diagnoses     Anemia, unspecified type    -  1    Renal insufficiency        NSAID long-term use        Chronic venous insufficiency        Hypertension goal BP (blood pressure) < 140/90        Fibromyalgia        Chronic pain syndrome        Need for prophylactic vaccination and inoculation against influenza          Care Instructions    You should stop taking the potassium tablets.                  You should minimize your ibuprofen use.       Keep taking the spironolactone,25 mg per day.           Follow-ups after your visit        Follow-up notes from your care team     Return in about 3 months (around 1/17/2019) for yearly wellness visit, labs will be needed.      Who to contact     If you have questions or need follow up information about today's clinic visit or your schedule please contact [facility] directly at [phone].  Normal or non-critical lab and imaging results will be communicated to you by [employer], letter or phone within 4 business days after the clinic has received the results. If you do not hear from us within 7 days, please contact the clinic through [employer] or phone. If you have a critical or abnormal lab result, we will notify you by phone as soon as possible.  Submit refill requests through [employer] or call your pharmacy and they will forward the refill request to us. Please allow 3 business days for your refill to be completed.          Additional Information About Your Visit        [employer] Information     [employer] gives you secure access to your electronic health record. If you see a primary care provider, you can also send messages to your care team and make  "appointments. If you have questions, please call your primary care clinic.  If you do not have a primary care provider, please call [phone] and they will assist you.        Care EveryWhere ID     This is your Care EveryWhere ID. This could be used by other organizations to access your [facility] medical records  [id number]        Your Vitals Were     Pulse Temperature Respirations Height Pulse Oximetry Breastfeeding?    88 97.5  F (36.4  C) 20 5' 1\" (1.549 m) 98% No    BMI (Body Mass Index)                   31.18 kg/m2            Blood Pressure from Last 3 Encounters:   10/17/18 126/64   01/17/18 130/78   01/10/17 128/78    Weight from Last 3 Encounters:   10/17/18 165 lb (74.8 kg)   01/17/18 168 lb (76.2 kg)   01/10/17 167 lb (75.8 kg)              We Performed the Following     ADMIN INFLUENZA (For MEDICARE Patients ONLY) []     FLU VACCINE, INCREASED ANTIGEN, PRESV FREE, AGE 65+ [89855]     PAF COMPLETED          Today's Medication Changes          These changes are accurate as of 10/17/18 12:01 PM.  If you have any questions, ask your nurse or doctor.               These medicines have changed or have updated prescriptions.        Dose/Directions    spironolactone 25 MG tablet   Commonly known as:  ALDACTONE   This may have changed:    - how much to take  - when to take this   Used for:  Chronic venous insufficiency, Hypertension goal BP (blood pressure) < 140/90   Changed by:  Rodney Narayanan MD        Dose:  25 mg   Take 1 tablet (25 mg) by mouth daily   Quantity:  90 tablet   Refills:  3         Stop taking these medicines if you haven't already. Please contact your care team if you have questions.     potassium chloride 10 MEQ tablet   Commonly known as:  K-TAB,KLOR-CON   Stopped by:  Rodney Narayanan MD                Where to get your medicines      Some of these will need a paper prescription and others can be bought over the counter.  Ask your nurse if you have questions.     Bring a paper " prescription for each of these medications     traMADol 50 MG tablet               Information about OPIOIDS     PRESCRIPTION OPIOIDS: WHAT YOU NEED TO KNOW   We gave you an opioid (narcotic) pain medicine. It is important to manage your pain, but opioids are not always the best choice. You should first try all the other options your care team gave you. Take this medicine for as short a time (and as few doses) as possible.    Some activities can increase your pain, such as bandage changes or therapy sessions. It may help to take your pain medicine 30 to 60 minutes before these activities. Reduce your stress by getting enough sleep, working on hobbies you enjoy and practicing relaxation or meditation. Talk to your care team about ways to manage your pain beyond prescription opioids.    These medicines have risks:    DO NOT drive when on new or higher doses of pain medicine. These medicines can affect your alertness and reaction times, and you could be arrested for driving under the influence (DUI). If you need to use opioids long-term, talk to your care team about driving.    DO NOT operate heavy machinery    DO NOT do any other dangerous activities while taking these medicines.    DO NOT drink any alcohol while taking these medicines.     If the opioid prescribed includes acetaminophen, DO NOT take with any other medicines that contain acetaminophen. Read all labels carefully. Look for the word  acetaminophen  or  Tylenol.  Ask your pharmacist if you have questions or are unsure.    You can get addicted to pain medicines, especially if you have a history of addiction (chemical, alcohol or substance dependence). Talk to your care team about ways to reduce this risk.    All opioids tend to cause constipation. Drink plenty of water and eat foods that have a lot of fiber, such as fruits, vegetables, prune juice, apple juice and high-fiber cereal. Take a laxative (Miralax, milk of magnesia, Colace, Senna) if you don t  move your bowels at least every other day. Other side effects include upset stomach, sleepiness, dizziness, throwing up, tolerance (needing more of the medicine to have the same effect), physical dependence and slowed breathing.    Store your pills in a secure place, locked if possible. We will not replace any lost or stolen medicine. If you don t finish your medicine, please throw away (dispose) as directed by your pharmacist. The [place] [employer]ution [employer] has more information about safe disposal: [email or web address]         Primary Care Provider Office Phone # Fax #    Rodney Narayanan [phone]       [address] [facility] [address]        Equal Access to Services     MISAEL REYES : Azucena fernandezo Soshiv, waaxda luqadaha, qaybta kaalmada adeegyada, daxa tavera . So [facility] [phone].    ATENCIÓN: Si habla español, tiene a garcia disposición servicios gratuitos de asistencia lingüística. Llame al [phone].    We comply with applicable federal civil rights laws and [place] laws. We do not discriminate on the basis of race, color, national origin, age, disability, sex, sexual orientation, or gender identity.            Thank you!     Thank you for choosing [facility]  for your care. Our goal is always to provide you with excellent care. Hearing back from our patients is one way we can continue to improve our services. Please take a few minutes to complete the written survey that you may receive in the mail after your visit with us. Thank you!             Your Updated Medication List - Protect others around you: Learn how to safely use, store and throw away your medicines at www.disposemymeds.org.          This list is accurate as of 10/17/18 12:01 PM.  Always use your most recent med list.                   Brand Name Dispense Instructions for use Diagnosis     aspirin 81 MG tablet      Take 1 tablet by mouth daily        atorvastatin 40 MG tablet    LIPITOR    90 tablet    Take 1 tablet (40 mg) by mouth daily    Hyperlipidemia with target LDL less than 100       Blood Glucose System Aung Kit      Dispense meter, test strips, lancets covered by pt ins. 250.00 NIDDM type II - Test 1 time/day        bumetanide 1 MG tablet    BUMEX    180 tablet    Take 1 tablet (1 mg) by mouth 2 times daily    Hypertension goal BP (blood pressure) < 140/90       busPIRone 10 MG tablet    BUSPAR    180 tablet    Take 1 tablet (10 mg) by mouth 2 times daily    Generalized anxiety disorder       cyanocobalamin 500 MCG tablet    Vitamin B-12    150 tablet         DULCOLAX 5 MG EC tablet   Generic drug:  bisacodyl      Take 5-10 mg by mouth        fish oil-omega-3 fatty acids 1000 MG capsule      Take 1 g by mouth daily.        levothyroxine 137 MCG tablet    SYNTHROID/LEVOTHROID    90 tablet    Take 1 tablet (137 mcg) by mouth daily    Hypothyroidism, unspecified type       metoprolol tartrate 50 MG tablet    LOPRESSOR    180 tablet    Take 1 tablet (50 mg) by mouth 2 times daily    Hypertension goal BP (blood pressure) < 140/90       MULTIVITAMIN PO      Take 1 tablet by mouth daily.        NITROSTAT 0.4 MG sublingual tablet   Generic drug:  nitroGLYcerin      Place 0.4 mg under the tongue        omeprazole 20 MG CR capsule    priLOSEC    90 capsule    Take 1 capsule (20 mg) by mouth daily    Gastroesophageal reflux disease without esophagitis       spironolactone 25 MG tablet    ALDACTONE    90 tablet    Take 1 tablet (25 mg) by mouth daily    Chronic venous insufficiency, Hypertension goal BP (blood pressure) < 140/90       traMADol 50 MG tablet    ULTRAM    270 tablet    TAKE 1 TABLET BY MOUTH 3 TIMES DAILY    Fibromyalgia, Chronic pain syndrome       vitamin D 2000 units Caps      Take 2 capsules by mouth daily.        warfarin 5 MG tablet    COUMADIN     Take 5 mg by mouth

## 2019-01-15 ENCOUNTER — TRANSFERRED RECORDS (OUTPATIENT)
Dept: HEALTH INFORMATION MANAGEMENT | Facility: CLINIC | Age: 79
End: 2019-01-15

## 2019-01-22 ENCOUNTER — OFFICE VISIT (OUTPATIENT)
Dept: FAMILY MEDICINE | Facility: CLINIC | Age: 79
End: 2019-01-22
Payer: COMMERCIAL

## 2019-01-22 VITALS
HEART RATE: 90 BPM | BODY MASS INDEX: 30.78 KG/M2 | HEIGHT: 61 IN | WEIGHT: 163 LBS | SYSTOLIC BLOOD PRESSURE: 138 MMHG | DIASTOLIC BLOOD PRESSURE: 74 MMHG | OXYGEN SATURATION: 97 % | TEMPERATURE: 97.5 F | RESPIRATION RATE: 20 BRPM

## 2019-01-22 DIAGNOSIS — Z00.00 ROUTINE GENERAL MEDICAL EXAMINATION AT A HEALTH CARE FACILITY: Primary | ICD-10-CM

## 2019-01-22 DIAGNOSIS — E78.5 HYPERLIPIDEMIA WITH TARGET LDL LESS THAN 100: ICD-10-CM

## 2019-01-22 DIAGNOSIS — K21.9 GASTROESOPHAGEAL REFLUX DISEASE WITHOUT ESOPHAGITIS: ICD-10-CM

## 2019-01-22 DIAGNOSIS — I10 HYPERTENSION GOAL BP (BLOOD PRESSURE) < 140/90: ICD-10-CM

## 2019-01-22 DIAGNOSIS — M79.7 FIBROMYALGIA: ICD-10-CM

## 2019-01-22 DIAGNOSIS — E03.9 HYPOTHYROIDISM, UNSPECIFIED TYPE: ICD-10-CM

## 2019-01-22 DIAGNOSIS — D64.9 ANEMIA, UNSPECIFIED TYPE: ICD-10-CM

## 2019-01-22 DIAGNOSIS — G89.4 CHRONIC PAIN SYNDROME: ICD-10-CM

## 2019-01-22 DIAGNOSIS — I48.0 PAF (PAROXYSMAL ATRIAL FIBRILLATION) (H): ICD-10-CM

## 2019-01-22 DIAGNOSIS — I87.2 CHRONIC VENOUS INSUFFICIENCY: ICD-10-CM

## 2019-01-22 DIAGNOSIS — R73.01 IMPAIRED FASTING GLUCOSE: ICD-10-CM

## 2019-01-22 DIAGNOSIS — F41.1 GENERALIZED ANXIETY DISORDER: ICD-10-CM

## 2019-01-22 LAB
BASOPHILS # BLD AUTO: 0.1 10E9/L (ref 0–0.2)
BASOPHILS NFR BLD AUTO: 1.2 %
DIFFERENTIAL METHOD BLD: NORMAL
EOSINOPHIL # BLD AUTO: 0 10E9/L (ref 0–0.7)
EOSINOPHIL NFR BLD AUTO: 0 %
ERYTHROCYTE [DISTWIDTH] IN BLOOD BY AUTOMATED COUNT: 13.1 % (ref 10–15)
HBA1C MFR BLD: 5.9 % (ref 0–5.6)
HCT VFR BLD AUTO: 40.6 % (ref 35–47)
HGB BLD-MCNC: 13.1 G/DL (ref 11.7–15.7)
LYMPHOCYTES # BLD AUTO: 2 10E9/L (ref 0.8–5.3)
LYMPHOCYTES NFR BLD AUTO: 26.5 %
MCH RBC QN AUTO: 29.6 PG (ref 26.5–33)
MCHC RBC AUTO-ENTMCNC: 32.3 G/DL (ref 31.5–36.5)
MCV RBC AUTO: 92 FL (ref 78–100)
MONOCYTES # BLD AUTO: 1.1 10E9/L (ref 0–1.3)
MONOCYTES NFR BLD AUTO: 14 %
NEUTROPHILS # BLD AUTO: 4.4 10E9/L (ref 1.6–8.3)
NEUTROPHILS NFR BLD AUTO: 58.3 %
PLATELET # BLD AUTO: 243 10E9/L (ref 150–450)
RBC # BLD AUTO: 4.42 10E12/L (ref 3.8–5.2)
WBC # BLD AUTO: 7.6 10E9/L (ref 4–11)

## 2019-01-22 PROCEDURE — 36415 COLL VENOUS BLD VENIPUNCTURE: CPT | Performed by: INTERNAL MEDICINE

## 2019-01-22 PROCEDURE — 83036 HEMOGLOBIN GLYCOSYLATED A1C: CPT | Performed by: INTERNAL MEDICINE

## 2019-01-22 PROCEDURE — 84439 ASSAY OF FREE THYROXINE: CPT | Performed by: INTERNAL MEDICINE

## 2019-01-22 PROCEDURE — 99397 PER PM REEVAL EST PAT 65+ YR: CPT | Performed by: INTERNAL MEDICINE

## 2019-01-22 PROCEDURE — 80061 LIPID PANEL: CPT | Performed by: INTERNAL MEDICINE

## 2019-01-22 PROCEDURE — 85025 COMPLETE CBC W/AUTO DIFF WBC: CPT | Performed by: INTERNAL MEDICINE

## 2019-01-22 PROCEDURE — 84443 ASSAY THYROID STIM HORMONE: CPT | Performed by: INTERNAL MEDICINE

## 2019-01-22 PROCEDURE — 82728 ASSAY OF FERRITIN: CPT | Performed by: INTERNAL MEDICINE

## 2019-01-22 RX ORDER — LEVOTHYROXINE SODIUM 137 UG/1
137 TABLET ORAL DAILY
Qty: 90 TABLET | Refills: 3 | Status: SHIPPED | OUTPATIENT
Start: 2019-01-22 | End: 2019-01-25

## 2019-01-22 RX ORDER — METOPROLOL TARTRATE 50 MG
50 TABLET ORAL 2 TIMES DAILY
Qty: 180 TABLET | Refills: 3 | Status: SHIPPED | OUTPATIENT
Start: 2019-01-22 | End: 2020-02-04

## 2019-01-22 RX ORDER — WARFARIN SODIUM 2.5 MG/1
TABLET ORAL
Qty: 50 TABLET | Refills: 4 | Status: SHIPPED | OUTPATIENT
Start: 2019-01-22 | End: 2020-01-30

## 2019-01-22 RX ORDER — WARFARIN SODIUM 5 MG/1
TABLET ORAL
Qty: 40 TABLET | Refills: 4 | Status: SHIPPED | OUTPATIENT
Start: 2019-01-22 | End: 2020-01-29

## 2019-01-22 RX ORDER — TRAMADOL HYDROCHLORIDE 50 MG/1
TABLET ORAL
Qty: 270 TABLET | Refills: 3 | Status: SHIPPED | OUTPATIENT
Start: 2019-01-22 | End: 2019-07-29

## 2019-01-22 RX ORDER — BUSPIRONE HYDROCHLORIDE 10 MG/1
10 TABLET ORAL 2 TIMES DAILY
Qty: 180 TABLET | Refills: 3 | Status: SHIPPED | OUTPATIENT
Start: 2019-01-22 | End: 2020-02-04

## 2019-01-22 RX ORDER — SPIRONOLACTONE 25 MG/1
25 TABLET ORAL DAILY
Qty: 90 TABLET | Refills: 3 | Status: SHIPPED | OUTPATIENT
Start: 2019-01-22 | End: 2020-01-29

## 2019-01-22 RX ORDER — BUMETANIDE 1 MG/1
1 TABLET ORAL 2 TIMES DAILY
Qty: 180 TABLET | Refills: 3 | Status: SHIPPED | OUTPATIENT
Start: 2019-01-22 | End: 2020-02-04

## 2019-01-22 RX ORDER — ATORVASTATIN CALCIUM 40 MG/1
40 TABLET, FILM COATED ORAL DAILY
Qty: 90 TABLET | Refills: 3 | Status: SHIPPED | OUTPATIENT
Start: 2019-01-22 | End: 2020-02-04

## 2019-01-22 ASSESSMENT — MIFFLIN-ST. JEOR: SCORE: 1151.74

## 2019-01-22 ASSESSMENT — ACTIVITIES OF DAILY LIVING (ADL): CURRENT_FUNCTION: NO ASSISTANCE NEEDED

## 2019-01-22 NOTE — LETTER
January 25, 2019      Jennifer RAMON Brian  [address] [facility] [address]        Dear ,    We are writing to inform you of your test results.               Your thyroid levels indicate that your dosage is slightly high.       I have sent an Rx to your pharmacy for the 125 mcg tablets.          To finish up your current supply, you could skip a dose every 10 days.                       Your other lab results are normal or stable,including the lipids,the average glucose,the bone marrow function,and the iron level (ferritin),which is borderline low.             When you purchase a bottle of multivitamins next, please get some that contain a little bit of iron.                        Results for orders placed or performed in visit on 01/22/19   Hemoglobin A1c   Result Value Ref Range    Hemoglobin A1C 5.9 (H) 0 - 5.6 %   Ferritin   Result Value Ref Range    Ferritin 46 8 - 252 ng/mL   Lipid Profile (Chol, Trig, HDL, LDL calc)   Result Value Ref Range    Cholesterol 141 <200 mg/dL    Triglycerides 161 (H) <150 mg/dL    HDL Cholesterol 43 (L) >49 mg/dL    LDL Cholesterol Calculated 66 <100 mg/dL    Non HDL Cholesterol 98 <130 mg/dL   TSH with free T4 reflex   Result Value Ref Range    TSH 0.25 (L) 0.40 - 4.00 mU/L   CBC with platelets and differential   Result Value Ref Range    WBC 7.6 4.0 - 11.0 10e9/L    RBC Count 4.42 3.8 - 5.2 10e12/L    Hemoglobin 13.1 11.7 - 15.7 g/dL    Hematocrit 40.6 35.0 - 47.0 %    MCV 92 78 - 100 fl    MCH 29.6 26.5 - 33.0 pg    MCHC 32.3 31.5 - 36.5 g/dL    RDW 13.1 10.0 - 15.0 %    Platelet Count 243 150 - 450 10e9/L    % Neutrophils 58.3 %    % Lymphocytes 26.5 %    % Monocytes 14.0 %    % Eosinophils 0.0 %    % Basophils 1.2 %    Absolute Neutrophil 4.4 1.6 - 8.3 10e9/L    Absolute Lymphocytes 2.0 0.8 - 5.3 10e9/L    Absolute Monocytes 1.1 0.0 - 1.3 10e9/L    Absolute Eosinophils 0.0 0.0 - 0.7 10e9/L    Absolute Basophils 0.1 0.0 - 0.2 10e9/L    Diff Method  Automated Method    T4 free   Result Value Ref Range    T4 Free 1.42 0.76 - 1.46 ng/dL         If you have any questions or concerns, please call the clinic at the number listed above.       Sincerely,        Rodney Narayanan MD

## 2019-01-22 NOTE — PROGRESS NOTES
"SUBJECTIVE:   Jennifer Torres is a 78 year old female who presents for Preventive Visit.      Are you in the first 12 months of your Medicare coverage?  No    Annual Wellness Visit     In general, how would you rate your overall health?  Good    Frequency of exercise:  1 day/week    Do you usually eat at least 4 servings of fruit and vegetables a day, include whole grains    & fiber and avoid regularly eating high fat or \"junk\" foods?  Yes    Medication side effects:  None    Ability to successfully perform activities of daily living:  No assistance needed    Home Safety:  No safety concerns identified    Hearing Impairment:  No hearing concerns    In the past 6 months, have you been bothered by leaking of urine?  No    In general, how would you rate your overall mental or emotional health?  Good    PHQ-2 Total Score: 0    Additional concerns today:  No    Do you feel safe in your environment? Yes    Do you have a Health Care Directive? Yes: Patient states has Advance Directive and will bring in a copy to clinic.      Fall risk  Fallen 2 or more times in the past year?: No  Any fall with injury in the past year?: No  No falls within last year  Cognitive Screening   1) Repeat 3 items (Leader, Season, Table)    2) Clock draw: NORMAL  3) 3 item recall: Recalls 3 objects  Results: 3 items recalled: COGNITIVE IMPAIRMENT LESS LIKELY    Mini-CogTM Copyright S James. Licensed by the author for use in [facility]; reprinted with permission (iglesia@.[facility]). All rights reserved.      Do you have sleep apnea, excessive snoring or daytime drowsiness?: no    Reviewed and updated as needed this visit by clinical staff  Tobacco  Allergies  Meds  Med Hx  Surg Hx  Fam Hx  Soc Hx        Reviewed and updated as needed this visit by Provider            Alcohol Use 1/22/2019   If you drink alcohol do you typically have greater than 3 drinks per day OR greater than 7 drinks per week? No           BNP and BMP normal " 1/15/19; glucose 88.                 See the Cardiology note 1/15/19; she took extra bumex for 3 days.             Cardiology wants her to keep taking bumex and not an alternate loop diuretic.      She walks her dog every day.                    She is requesting med refills, including tramadol.         Stable use for several years.          Current providers sharing in care for this patient include:   Patient Care Team:  Rodney Narayanan MD as PCP - General (Internal Medicine)  Rodney Narayanan MD as PCP - Assigned PCP  Cheryl Estrada, RN as  (Family Medicine - Geriatric Medicine)    The following health maintenance items are reviewed in Epic and correct as of today:  Health Maintenance   Topic Date Due     URINE DRUG SCREEN Q1 YR  1955     NOEMÍ QUESTIONNAIRE 1 YEAR  1957     ZOSTER IMMUNIZATION (1 of 2) 1989     ADVANCE DIRECTIVE PLANNING Q5 YRS  1994     PHQ-9 Q1YR  07/10/2014     FALL RISK ASSESSMENT  01/10/2018     LIPID SCREEN Q5 YR FEMALE (SYSTEM ASSIGNED)  2023     DTAP/TDAP/TD IMMUNIZATION (3 - Td) 2024     DEXA SCAN SCREENING (SYSTEM ASSIGNED)  Completed     INFLUENZA VACCINE  Completed     IPV IMMUNIZATION  Aged Out     MENINGITIS IMMUNIZATION  Aged Out     Patient Active Problem List    Diagnosis Date Noted     PAF (paroxysmal atrial fibrillation) (H) 2016     Priority: High     Ablation and amiodarone and warfarin in ; bradycardia in ; pacemaker placed.              amio stopped in 10/16.     Warfarin stopped by Cardiology in 10/16, and restarted by them in          Chronic pain syndrome 2016     Priority: High      checked 18   No controlled substance agreement on file./       Grief reaction 2015     Priority: High     Son  age 53       Rib fractures 2015     Priority: High     Hypertension goal BP (blood pressure) < 140/90 2013     Priority: High     SOB (shortness of breath) 2013      Priority: High     See cardiology letter 6/13 and 9/13; PFT's nml except some air trapping; pt reports MDI did not help       Lumbar spinal stenosis 04/02/2013     Priority: High     Impaired fasting glucose      Priority: High     , A1C 7.8 in 4/13; add metformin.  7/13, A1C down to 6.1 ; 115 and 6.3 in 12/13; 82 in 12/14;     110 and 6.3 in 1/16; 107 and 6.0 in 1/17 , 98 and 5.9 in 1/18       Coronary atherosclerosis      Priority: High     LAD stent 2008;              See cardiology note 3/13; pt has ? Anginal sx; also severe DILLON; in 9/13,  cor angio showed multiple 50% lesions           Gastroesophageal reflux disease without esophagitis 01/22/2019     Priority: Medium     Anemia, unspecified type 10/17/2018     Priority: Medium     NSAID long-term use 10/17/2018     Priority: Medium     Erythrocytosis 01/18/2018     Priority: Medium     hgb 17.3 in 1/18       Hypothyroidism, unspecified type 01/17/2018     Priority: Medium     MENDEL (obstructive sleep apnea) 01/17/2018     Priority: Medium     Dx 2017?; is using CPAP       Routine general medical examination at a health care facility 01/10/2017     Priority: Medium     Hyperlipidemia with target LDL less than 100 12/24/2013     Priority: Medium     Diagnosis updated by automated process. Provider to review and confirm.       Generalized anxiety disorder 12/24/2013     Priority: Medium     Diagnosis updated by automated process. Provider to review and confirm.       Hypothyroidism 12/24/2013     Priority: Medium     Chronic low back pain 07/09/2013     Priority: Medium     PHQ-9 = 10 in 7/13       Spinal fusion failure (H) 07/09/2013     Priority: Medium     Iron deficiency anemia 04/02/2013     Priority: Medium     Took iron for 2 months late 2012; and in 2013.           In 4/13, ferritin 12; in 7/13, up to 25 with Fe;  In 12/13, ferritin only 27 but Hgb up to 13.7; FIT neg in 1/14. Not taking Fe in 12/14;  56 and 14;         43 and 13 in 1/16        25  "and 13.2 in 1/17; resume Fe QOD. Not taking Fe in 1/18; 58 and 17.3 in 1/18   (hgb 17.3 is an error);         13.1 and 46 in 1/19;  Add a MVI with Fe       Fibromyalgia      Priority: Medium     Sees Dr. Valverde; he retired 2012       Chronic venous insufficiency      Priority: Medium     Preventive measure 04/02/2013     Priority: Low     APRIMA DATA BASE UNDER THE 12/4/12 NOTE  Colonoscopy 6/05  FIT neg in 1/14        Colonoscopy neg in 2/16        Mammogram 1/16, 1/18                 Bone density \"normal\"  2009; ERT 9380-9789           Health Care Home 10/03/2012     Priority: Low     Cheryl Estrada BS, RN, PHN  [facility]    [phone]      DX V65.8 REPLACED WITH 00104 [facility] (04/08/2013)         Past Surgical History:   Procedure Laterality Date     APPENDECTOMY  1956     BACK SURGERY  9/12    lumbar spine fusion; Dr. Marvel BECKHAM TOTAL KNEE ARTHROPLASTY Bilateral 2004,2005     CARPAL TUNNEL RELEASE RT/LT Bilateral 1974     CHOLECYSTECTOMY  2001     HYSTERECTOMY  1977    and L oophorectomy     Social History     Socioeconomic History     Marital status:      Spouse name: Not on file     Number of children: 4     Years of education: Not on file     Highest education level: Not on file   Social Needs     Financial resource strain: Not on file     Food insecurity - worry: Not on file     Food insecurity - inability: Not on file     Transportation needs - medical: Not on file     Transportation needs - non-medical: Not on file   Occupational History     Not on file   Tobacco Use     Smoking status: Never Smoker     Smokeless tobacco: Never Used   Substance and Sexual Activity     Alcohol use: Yes     Comment: occ.     Drug use: No     Sexual activity: Yes     Partners: Male   Other Topics Concern     Parent/sibling w/ CABG, MI or angioplasty before 65F 55M? Yes   Social History Narrative     Not on file     Immunization History   Administered Date(s) Administered     Influenza (High " Dose) 3 valent vaccine 12/24/2013, 12/29/2014, 01/06/2016, 01/10/2017, 01/17/2018, 10/17/2018     Influenza (IIV3) PF 11/14/2011, 12/04/2012     Pneumo Conj 13-V (2010&after) 01/10/2017     Pneumococcal 23 valent 10/28/2009     TD (ADULT, 7+) 12/24/2004     TDAP Vaccine (Adacel) 12/29/2014       BP Readings from Last 3 Encounters:   01/22/19 138/74   10/17/18 126/64   01/17/18 130/78    Wt Readings from Last 3 Encounters:   01/22/19 73.9 kg (163 lb)   10/17/18 74.8 kg (165 lb)   01/17/18 76.2 kg (168 lb)                  Current Outpatient Medications   Medication Sig Dispense Refill     aspirin 81 MG tablet Take 1 tablet by mouth daily       atorvastatin (LIPITOR) 40 MG tablet Take 1 tablet (40 mg) by mouth daily 90 tablet 3     bisacodyl (DULCOLAX) 5 MG EC tablet Take 5-10 mg by mouth       Blood Glucose Monitoring Suppl (BLOOD GLUCOSE SYSTEM YARELY) KIT Dispense meter, test strips, lancets covered by pt ins. 250.00 NIDDM type II - Test 1 time/day       bumetanide (BUMEX) 1 MG tablet Take 1 tablet (1 mg) by mouth 2 times daily 180 tablet 3     busPIRone (BUSPAR) 10 MG tablet Take 1 tablet (10 mg) by mouth 2 times daily 180 tablet 3     Cholecalciferol (VITAMIN D) 2000 UNITS CAPS Take 2 capsules by mouth daily.       Cyanocobalamin (B-12) 500 MCG TABS  150 tablet      fish oil-omega-3 fatty acids (FISH OIL) 1000 MG capsule Take 1 g by mouth daily.       levothyroxine (SYNTHROID/LEVOTHROID) 137 MCG tablet Take 1 tablet (137 mcg) by mouth daily 90 tablet 3     metoprolol tartrate (LOPRESSOR) 50 MG tablet Take 1 tablet (50 mg) by mouth 2 times daily 180 tablet 3     Multiple Vitamins-Minerals (MULTIVITAMIN OR) Take 1 tablet by mouth daily.       nitroglycerin (NITROSTAT) 0.4 MG SL tablet Place 0.4 mg under the tongue       omeprazole (PRILOSEC) 20 MG DR capsule Take 1 capsule (20 mg) by mouth daily 90 capsule 3     spironolactone (ALDACTONE) 25 MG tablet Take 1 tablet (25 mg) by mouth daily 90 tablet 3     traMADol  "(ULTRAM) 50 MG tablet TAKE 1 TABLET BY MOUTH 3 TIMES DAILY 270 tablet 3     warfarin (COUMADIN) 2.5 MG tablet As of 1/22/19, she takes 2.5 mg on 4 days per week, and 5 mg on 3 days per week. 50 tablet 4     warfarin (COUMADIN) 5 MG tablet As of 1/22/19, she take 5 mg 3 days per week, and 2.5 mg on 4 days per week. 40 tablet 4     No Known Allergies      Review of Systems  CONSTITUTIONAL: NEGATIVE for fever, chills, change in weight  INTEGUMENTARY/SKIN: NEGATIVE for worrisome rashes, moles or lesions  EYES: NEGATIVE for vision changes or irritation  ENT/MOUTH: NEGATIVE for ear, mouth and throat problems  RESP: NEGATIVE for significant cough or SOB  BREAST: NEGATIVE for masses, tenderness or discharge  CV: NEGATIVE for chest pain/chest pressure and palpitations  GI: NEGATIVE for nausea, abdominal pain, heartburn, or change in bowel habits  : NEGATIVE for frequency, dysuria, or hematuria  MUSCULOSKELETAL:neck and back pain; plans further injection Rx  NEURO: NEGATIVE for weakness, dizziness or paresthesias  ENDOCRINE: NEGATIVE for temperature intolerance, skin/hair changes  HEME: NEGATIVE for bleeding problems  PSYCHIATRIC: NEGATIVE for changes in mood or affect    OBJECTIVE:   /74   Pulse 90   Temp 97.5  F (36.4  C)   Resp 20   Ht 1.549 m (5' 1\")   Wt 73.9 kg (163 lb)   SpO2 97%   Breastfeeding? No   BMI 30.80 kg/m   Estimated body mass index is 30.8 kg/m  as calculated from the following:    Height as of this encounter: 1.549 m (5' 1\").    Weight as of this encounter: 73.9 kg (163 lb).  Physical Exam  GENERAL APPEARANCE: alert and no distress  EYES: Eyes grossly normal to inspection  HENT: ear canals and TM's normal, nose and mouth without ulcers or lesions, oropharynx clear and oral mucous membranes moist  NECK: no adenopathy, no asymmetry, masses, or scars and thyroid normal to palpation  RESP: lungs clear to auscultation - no rales, rhonchi or wheezes  BREAST: normal without masses, tenderness or " nipple discharge and no palpable axillary masses or adenopathy  CV: regular rates and rhythm, normal S1 S2, no S3 or S4, no murmur, click or rub and varicosities both lower legs  ABDOMEN: soft, nontender, no hepatosplenomegaly and no masses  MS: spine/back exam reveals mild kyphosis  SKIN: no suspicious lesions or rashes  NEURO: mentation intact and speech normal  PSYCH: mentation appears normal and affect normal/bright    Diagnostic Test Results:  pending    ASSESSMENT / PLAN:   Jennifer was seen today for physical.    Diagnoses and all orders for this visit:    Routine general medical examination at a health care facility    Hypothyroidism, unspecified type  -     TSH with free T4 reflex  -     levothyroxine (SYNTHROID/LEVOTHROID) 137 MCG tablet; Take 1 tablet (137 mcg) by mouth daily    Impaired fasting glucose  -     Hemoglobin A1c    Anemia, unspecified type  -     Ferritin  -     CBC with platelets and differential    Fibromyalgia  -     traMADol (ULTRAM) 50 MG tablet; TAKE 1 TABLET BY MOUTH 3 TIMES DAILY    Hypertension goal BP (blood pressure) < 140/90  -     bumetanide (BUMEX) 1 MG tablet; Take 1 tablet (1 mg) by mouth 2 times daily  -     metoprolol tartrate (LOPRESSOR) 50 MG tablet; Take 1 tablet (50 mg) by mouth 2 times daily  -     spironolactone (ALDACTONE) 25 MG tablet; Take 1 tablet (25 mg) by mouth daily    Chronic pain syndrome  -     traMADol (ULTRAM) 50 MG tablet; TAKE 1 TABLET BY MOUTH 3 TIMES DAILY    Hyperlipidemia with target LDL less than 100  -     Lipid Profile (Chol, Trig, HDL, LDL calc)  -     atorvastatin (LIPITOR) 40 MG tablet; Take 1 tablet (40 mg) by mouth daily    Generalized anxiety disorder  -     busPIRone (BUSPAR) 10 MG tablet; Take 1 tablet (10 mg) by mouth 2 times daily    Gastroesophageal reflux disease without esophagitis  -     omeprazole (PRILOSEC) 20 MG DR capsule; Take 1 capsule (20 mg) by mouth daily    Chronic venous insufficiency  -     spironolactone (ALDACTONE) 25  "MG tablet; Take 1 tablet (25 mg) by mouth daily    PAF (paroxysmal atrial fibrillation) (H)  -     warfarin (COUMADIN) 5 MG tablet; As of 1/22/19, she take 5 mg 3 days per week, and 2.5 mg on 4 days per week.  -     warfarin (COUMADIN) 2.5 MG tablet; As of 1/22/19, she takes 2.5 mg on 4 days per week, and 5 mg on 3 days per week.            Summary and implications:  Overall, she is stable, despite multiple med problems.                 Lytes,renal fxn,glucose not repeated today; normal 1 wk ago.               meds refill; ok for analgesic refill.               Patient Instructions   I will let you know your lab results.   Consider getting the shingles vaccine (Shingrix) at your pharmacy.       She also plans a mammogram   Return in about 1 year (around 1/22/2020) for yearly wellness visit, labs will be needed, medication review and refills.          End of Life Planning:  Patient currently has an advanced directive: Yes.  Practitioner is supportive of decision.    COUNSELING:  Reviewed preventive health counseling, as reflected in patient instructions    BP Readings from Last 1 Encounters:   01/22/19 138/74     Estimated body mass index is 30.8 kg/m  as calculated from the following:    Height as of this encounter: 1.549 m (5' 1\").    Weight as of this encounter: 73.9 kg (163 lb).      Weight management plan: Discussed healthy diet and exercise guidelines     reports that  has never smoked. she has never used smokeless tobacco.      Appropriate preventive services were discussed with this patient, including applicable screening as appropriate for cardiovascular disease, diabetes, osteopenia/osteoporosis, and glaucoma.  As appropriate for age/gender, discussed screening for colorectal cancer, prostate cancer, breast cancer, and cervical cancer. Checklist reviewing preventive services available has been given to the patient.    Reviewed patients plan of care and provided an AVS. The Basic Care Plan (routine screening " as documented in Health Maintenance) for Jennifer meets the Care Plan requirement. This Care Plan has been established and reviewed with the Patient.    Counseling Resources:  ATP IV Guidelines  Pooled Cohorts Equation Calculator  Breast Cancer Risk Calculator  FRAX Risk Assessment  ICSI Preventive Guidelines  Dietary Guidelines for Americans, 2010  USDA's MyPlate  ASA Prophylaxis  Lung CA Screening    Rodney Narayanan MD  [facility]                     Results for orders placed or performed in visit on 01/22/19   Hemoglobin A1c   Result Value Ref Range    Hemoglobin A1C 5.9 (H) 0 - 5.6 %   Ferritin   Result Value Ref Range    Ferritin 46 8 - 252 ng/mL   Lipid Profile (Chol, Trig, HDL, LDL calc)   Result Value Ref Range    Cholesterol 141 <200 mg/dL    Triglycerides 161 (H) <150 mg/dL    HDL Cholesterol 43 (L) >49 mg/dL    LDL Cholesterol Calculated 66 <100 mg/dL    Non HDL Cholesterol 98 <130 mg/dL   TSH with free T4 reflex   Result Value Ref Range    TSH 0.25 (L) 0.40 - 4.00 mU/L   CBC with platelets and differential   Result Value Ref Range    WBC 7.6 4.0 - 11.0 10e9/L    RBC Count 4.42 3.8 - 5.2 10e12/L    Hemoglobin 13.1 11.7 - 15.7 g/dL    Hematocrit 40.6 35.0 - 47.0 %    MCV 92 78 - 100 fl    MCH 29.6 26.5 - 33.0 pg    MCHC 32.3 31.5 - 36.5 g/dL    RDW 13.1 10.0 - 15.0 %    Platelet Count 243 150 - 450 10e9/L    % Neutrophils 58.3 %    % Lymphocytes 26.5 %    % Monocytes 14.0 %    % Eosinophils 0.0 %    % Basophils 1.2 %    Absolute Neutrophil 4.4 1.6 - 8.3 10e9/L    Absolute Lymphocytes 2.0 0.8 - 5.3 10e9/L    Absolute Monocytes 1.1 0.0 - 1.3 10e9/L    Absolute Eosinophils 0.0 0.0 - 0.7 10e9/L    Absolute Basophils 0.1 0.0 - 0.2 10e9/L    Diff Method Automated Method    T4 free   Result Value Ref Range    T4 Free 1.42 0.76 - 1.46 ng/dL      letter and My chart message sent.                     Your thyroid levels indicate that your dosage is slightly high.       I have sent an  Rx to your pharmacy for the 125 mcg tablets.          To finish up your current supply, you could skip a dose every 10 days.                       Your other lab results are normal or stable,including the lipids,the average glucose,the bone marrow function,and the iron level (ferritin),which is borderline low.             When you purchase a bottle of multivitamins next, please get some that contain a little bit of iron.

## 2019-01-22 NOTE — PATIENT INSTRUCTIONS
I will let you know your lab results.   Consider getting the shingles vaccine (Shingrix) at your pharmacy.

## 2019-01-24 LAB
CHOLEST SERPL-MCNC: 141 MG/DL
FERRITIN SERPL-MCNC: 46 NG/ML (ref 8–252)
HDLC SERPL-MCNC: 43 MG/DL
LDLC SERPL CALC-MCNC: 66 MG/DL
NONHDLC SERPL-MCNC: 98 MG/DL
T4 FREE SERPL-MCNC: 1.42 NG/DL (ref 0.76–1.46)
TRIGL SERPL-MCNC: 161 MG/DL
TSH SERPL DL<=0.005 MIU/L-ACNC: 0.25 MU/L (ref 0.4–4)

## 2019-01-25 RX ORDER — LEVOTHYROXINE SODIUM 125 UG/1
137 TABLET ORAL DAILY
Qty: 90 TABLET | Refills: 3 | Status: SHIPPED | OUTPATIENT
Start: 2019-01-25 | End: 2019-06-13

## 2019-02-12 ENCOUNTER — TRANSFERRED RECORDS (OUTPATIENT)
Dept: HEALTH INFORMATION MANAGEMENT | Facility: CLINIC | Age: 79
End: 2019-02-12

## 2019-02-20 ENCOUNTER — TRANSFERRED RECORDS (OUTPATIENT)
Dept: HEALTH INFORMATION MANAGEMENT | Facility: CLINIC | Age: 79
End: 2019-02-20

## 2019-06-11 ENCOUNTER — OFFICE VISIT (OUTPATIENT)
Dept: FAMILY MEDICINE | Facility: CLINIC | Age: 79
End: 2019-06-11
Payer: COMMERCIAL

## 2019-06-11 VITALS
WEIGHT: 177 LBS | DIASTOLIC BLOOD PRESSURE: 70 MMHG | HEIGHT: 61 IN | HEART RATE: 69 BPM | TEMPERATURE: 97.9 F | SYSTOLIC BLOOD PRESSURE: 130 MMHG | BODY MASS INDEX: 33.42 KG/M2 | OXYGEN SATURATION: 99 % | RESPIRATION RATE: 20 BRPM

## 2019-06-11 DIAGNOSIS — T14.8XXA HEMATOMA OF SKIN: ICD-10-CM

## 2019-06-11 DIAGNOSIS — R63.5 ABNORMAL WEIGHT GAIN: ICD-10-CM

## 2019-06-11 DIAGNOSIS — E03.9 HYPOTHYROIDISM, UNSPECIFIED TYPE: Primary | ICD-10-CM

## 2019-06-11 DIAGNOSIS — D64.9 ANEMIA, UNSPECIFIED TYPE: ICD-10-CM

## 2019-06-11 DIAGNOSIS — R73.01 IMPAIRED FASTING GLUCOSE: ICD-10-CM

## 2019-06-11 LAB
ALBUMIN SERPL-MCNC: 3.8 G/DL (ref 3.4–5)
ALP SERPL-CCNC: 82 U/L (ref 40–150)
ALT SERPL W P-5'-P-CCNC: 40 U/L (ref 0–50)
ANION GAP SERPL CALCULATED.3IONS-SCNC: 10 MMOL/L (ref 3–14)
AST SERPL W P-5'-P-CCNC: 55 U/L (ref 0–45)
BASOPHILS # BLD AUTO: 0 10E9/L (ref 0–0.2)
BASOPHILS NFR BLD AUTO: 0.7 %
BILIRUB SERPL-MCNC: 0.7 MG/DL (ref 0.2–1.3)
BUN SERPL-MCNC: 34 MG/DL (ref 7–30)
CALCIUM SERPL-MCNC: 8.9 MG/DL (ref 8.5–10.1)
CHLORIDE SERPL-SCNC: 106 MMOL/L (ref 94–109)
CO2 SERPL-SCNC: 26 MMOL/L (ref 20–32)
CREAT SERPL-MCNC: 1.19 MG/DL (ref 0.52–1.04)
DIFFERENTIAL METHOD BLD: NORMAL
EOSINOPHIL # BLD AUTO: 0 10E9/L (ref 0–0.7)
EOSINOPHIL NFR BLD AUTO: 0 %
ERYTHROCYTE [DISTWIDTH] IN BLOOD BY AUTOMATED COUNT: 14.4 % (ref 10–15)
FERRITIN SERPL-MCNC: 59 NG/ML (ref 8–252)
GFR SERPL CREATININE-BSD FRML MDRD: 43 ML/MIN/{1.73_M2}
GLUCOSE SERPL-MCNC: 97 MG/DL (ref 70–99)
HBA1C MFR BLD: 6.2 % (ref 0–5.6)
HCT VFR BLD AUTO: 36.4 % (ref 35–47)
HGB BLD-MCNC: 12.4 G/DL (ref 11.7–15.7)
LYMPHOCYTES # BLD AUTO: 1.4 10E9/L (ref 0.8–5.3)
LYMPHOCYTES NFR BLD AUTO: 22.3 %
MCH RBC QN AUTO: 31.1 PG (ref 26.5–33)
MCHC RBC AUTO-ENTMCNC: 34.1 G/DL (ref 31.5–36.5)
MCV RBC AUTO: 91 FL (ref 78–100)
MONOCYTES # BLD AUTO: 1.1 10E9/L (ref 0–1.3)
MONOCYTES NFR BLD AUTO: 17.2 %
NEUTROPHILS # BLD AUTO: 3.7 10E9/L (ref 1.6–8.3)
NEUTROPHILS NFR BLD AUTO: 59.8 %
PLATELET # BLD AUTO: 209 10E9/L (ref 150–450)
POTASSIUM SERPL-SCNC: 4 MMOL/L (ref 3.4–5.3)
PROT SERPL-MCNC: 7.1 G/DL (ref 6.8–8.8)
RBC # BLD AUTO: 3.99 10E12/L (ref 3.8–5.2)
SODIUM SERPL-SCNC: 141 MMOL/L (ref 133–144)
T4 FREE SERPL-MCNC: 1.1 NG/DL (ref 0.76–1.46)
TSH SERPL DL<=0.005 MIU/L-ACNC: 6.91 MU/L (ref 0.4–4)
WBC # BLD AUTO: 6.1 10E9/L (ref 4–11)

## 2019-06-11 PROCEDURE — 82728 ASSAY OF FERRITIN: CPT | Performed by: INTERNAL MEDICINE

## 2019-06-11 PROCEDURE — 84443 ASSAY THYROID STIM HORMONE: CPT | Performed by: INTERNAL MEDICINE

## 2019-06-11 PROCEDURE — 80053 COMPREHEN METABOLIC PANEL: CPT | Performed by: INTERNAL MEDICINE

## 2019-06-11 PROCEDURE — 36415 COLL VENOUS BLD VENIPUNCTURE: CPT | Performed by: INTERNAL MEDICINE

## 2019-06-11 PROCEDURE — 99214 OFFICE O/P EST MOD 30 MIN: CPT | Performed by: INTERNAL MEDICINE

## 2019-06-11 PROCEDURE — 83036 HEMOGLOBIN GLYCOSYLATED A1C: CPT | Performed by: INTERNAL MEDICINE

## 2019-06-11 PROCEDURE — 84439 ASSAY OF FREE THYROXINE: CPT | Performed by: INTERNAL MEDICINE

## 2019-06-11 PROCEDURE — 85025 COMPLETE CBC W/AUTO DIFF WBC: CPT | Performed by: INTERNAL MEDICINE

## 2019-06-11 ASSESSMENT — MIFFLIN-ST. JEOR: SCORE: 1215.25

## 2019-06-11 NOTE — PROGRESS NOTES
"Subjective     Jennifer RAMON Torres is a 78 year old female who presents to clinic today for the following health issues:    HPI   Hypothyroidism Follow-up      Since last visit, patient describes the following symptoms: weight gain of 12 lbs      Amount of exercise or physical activity: None    Problems taking medications regularly: No    Medication side effects: none    Diet: low salt and low fat/cholesterol      Medication Followup of ASA    Taking Medication as prescribed: NO    Side Effects:  easilly bruising and increase of     Medication Helping Symptoms:  Is on Warfarin and Cardio DrJd Aware.                        Using venous pumps for her legs.      Here is the consult note from 2/19: Assessment and Plan:  Varicose vein venous insufficiency. Ms Torres has a history of a right GSV ablation and sclerotherapy April 2016. 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 After that procedure, she did not notice any significant improvement in her symptoms, therefore, held off on having the left leg done. She comes in today as her symptoms seem to be progressively worsening. She only wears her compression socks at night because she has the most discomfort at that point, but I told her that the real benefit would be wearing them during the day and removing them at night when her legs are elevated.     She wants to avoid having any more procedures done, therefore, would not order venous insufficiency ultrasound at this time. We will try more conservative measures including compression socks, walking program, continuing diuretic use, sodium restriction. I recommendation starting with a lymphedema pump.We could consider [facility] referral to [facility], but would hold off until next visit if the pumps do not help her symptoms. We will plan to bring her back in 3 months time for further discussion and she should continue to follow with Lubna Ovalle CNP as scheduled. \"               "                                                                                                                                                                                              This is from my note:     Legs are smaller afterwards.                 bumex 1 mg bid; spironolactone every day.                C/o DILLON and fatigue.              Wonders if the lower thyroid dose is responsible.                                   She fell 2 wks ago, and has a large hematoma with ecchymosis R lower leg.               She stopped taking ASA,on her own.                                            Current Outpatient Medications   Medication Sig Dispense Refill     atorvastatin (LIPITOR) 40 MG tablet Take 1 tablet (40 mg) by mouth daily 90 tablet 3     bisacodyl (DULCOLAX) 5 MG EC tablet Take 5-10 mg by mouth       Blood Glucose Monitoring Suppl (BLOOD GLUCOSE SYSTEM YARELY) KIT Dispense meter, test strips, lancets covered by pt ins. 250.00 NIDDM type II - Test 1 time/day       bumetanide (BUMEX) 1 MG tablet Take 1 tablet (1 mg) by mouth 2 times daily 180 tablet 3     busPIRone (BUSPAR) 10 MG tablet Take 1 tablet (10 mg) by mouth 2 times daily 180 tablet 3     Cholecalciferol (VITAMIN D) 2000 UNITS CAPS Take 2 capsules by mouth daily.       Cyanocobalamin (B-12) 500 MCG TABS  150 tablet      fish oil-omega-3 fatty acids (FISH OIL) 1000 MG capsule Take 1 g by mouth daily.       levothyroxine (SYNTHROID/LEVOTHROID) 125 MCG tablet Take 1 tablet (125 mcg) by mouth daily 90 tablet 3     metoprolol tartrate (LOPRESSOR) 50 MG tablet Take 1 tablet (50 mg) by mouth 2 times daily 180 tablet 3     Multiple Vitamins-Minerals (MULTIVITAMIN OR) Take 1 tablet by mouth daily.       nitroglycerin (NITROSTAT) 0.4 MG SL tablet Place 0.4 mg under the tongue       omeprazole (PRILOSEC) 20 MG DR capsule Take 1 capsule (20 mg) by mouth daily 90 capsule 3     spironolactone (ALDACTONE) 25 MG tablet Take 1 tablet (25 mg) by mouth daily 90 tablet 3  "    traMADol (ULTRAM) 50 MG tablet TAKE 1 TABLET BY MOUTH 3 TIMES DAILY 270 tablet 3     warfarin (COUMADIN) 2.5 MG tablet As of 1/22/19, she takes 2.5 mg on 4 days per week, and 5 mg on 3 days per week. 50 tablet 4     warfarin (COUMADIN) 5 MG tablet As of 1/22/19, she take 5 mg 3 days per week, and 2.5 mg on 4 days per week. 40 tablet 4     aspirin 81 MG tablet Take 1 tablet by mouth daily       BP Readings from Last 3 Encounters:   06/11/19 130/70   01/22/19 138/74   10/17/18 126/64    Wt Readings from Last 3 Encounters:   06/11/19 80.3 kg (177 lb)   01/22/19 73.9 kg (163 lb)   10/17/18 74.8 kg (165 lb)                      Reviewed and updated as needed this visit by Provider         Review of Systems   ROS COMP: CONSTITUTIONAL:POSITIVE  for fatigue and weight gain  RESP:POSITIVE for dyspnea on exertion  CV: POSITIVE for lower extremity edema and NEGATIVE for chest pain/chest pressure  HEME/ALLERGY/IMMUNE: POSITIVE  for bruising      Objective    /70 (BP Location: Left arm, Patient Position: Chair, Cuff Size: Adult Large)   Pulse 69   Temp 97.9  F (36.6  C)   Resp 20   Ht 1.549 m (5' 1\")   Wt 80.3 kg (177 lb)   SpO2 99%   Breastfeeding? No   BMI 33.44 kg/m    Body mass index is 33.44 kg/m .  Physical Exam   GENERAL APPEARANCE: alert, no distress, over weight and fatigued  RESP: no rales or rhonchi  CV: regular rates and rhythm, normal S1 S2, no S3 or S4, no murmur, click or rub and pitting B/L LE edema to 1+  MS: hhematoma below the right knee, with ecchymosis and swelling  Right lower leg below the knee    Diagnostic Test Results:pending        Assessment & Plan     Jennifer was seen today for thyroid problem.    Diagnoses and all orders for this visit:    Hypothyroidism, unspecified type  -     TSH with free T4 reflex    Anemia, unspecified type  -     Ferritin  -     CBC with platelets and differential    Impaired fasting glucose  -     Comprehensive metabolic panel (BMP + Alb, Alk Phos, ALT, " "AST, Total. Bili, TP)  -     Hemoglobin A1c    Abnormal weight gain    Hematoma of skin  Comments:  R lower leg         BMI:   Estimated body mass index is 33.44 kg/m  as calculated from the following:    Height as of this encounter: 1.549 m (5' 1\").    Weight as of this encounter: 80.3 kg (177 lb).   Weight management plan: Discussed healthy diet and exercise guidelines        Summary and implications:  We reviewed multiple issues.           We reviewed all of the issues on the diagnoses list.            etiology of her weight gain is uncertain. Some of this could be fluid retention.        Thyroid functions are checked.                              She has a large hematoma right lower leg. Her hemoglobin could be lower as a result of this.            Check labs and adjust medications as indicated.    Patient Instructions   I will let you know your lab results.         Return in about 4 months (around 10/11/2019) for medication review and refills, labs will be needed.    Rodney Narayanan MD  [facility]    Results for orders placed or performed in visit on 06/11/19   Comprehensive metabolic panel (BMP + Alb, Alk Phos, ALT, AST, Total. Bili, TP)   Result Value Ref Range    Sodium 141 133 - 144 mmol/L    Potassium 4.0 3.4 - 5.3 mmol/L    Chloride 106 94 - 109 mmol/L    Carbon Dioxide 26 20 - 32 mmol/L    Anion Gap 10 3 - 14 mmol/L    Glucose 97 70 - 99 mg/dL    Urea Nitrogen 34 (H) 7 - 30 mg/dL    Creatinine 1.19 (H) 0.52 - 1.04 mg/dL    GFR Estimate 43 (L) >60 mL/min/[1.73_m2]    GFR Estimate If Black 50 (L) >60 mL/min/[1.73_m2]    Calcium 8.9 8.5 - 10.1 mg/dL    Bilirubin Total 0.7 0.2 - 1.3 mg/dL    Albumin 3.8 3.4 - 5.0 g/dL    Protein Total 7.1 6.8 - 8.8 g/dL    Alkaline Phosphatase 82 40 - 150 U/L    ALT 40 0 - 50 U/L    AST 55 (H) 0 - 45 U/L   Hemoglobin A1c   Result Value Ref Range    Hemoglobin A1C 6.2 (H) 0 - 5.6 %   Ferritin   Result Value Ref Range    Ferritin 59 8 - 252 ng/mL "   TSH with free T4 reflex   Result Value Ref Range    TSH 6.91 (H) 0.40 - 4.00 mU/L   CBC with platelets and differential   Result Value Ref Range    WBC 6.1 4.0 - 11.0 10e9/L    RBC Count 3.99 3.8 - 5.2 10e12/L    Hemoglobin 12.4 11.7 - 15.7 g/dL    Hematocrit 36.4 35.0 - 47.0 %    MCV 91 78 - 100 fl    MCH 31.1 26.5 - 33.0 pg    MCHC 34.1 31.5 - 36.5 g/dL    RDW 14.4 10.0 - 15.0 %    Platelet Count 209 150 - 450 10e9/L    % Neutrophils 59.8 %    % Lymphocytes 22.3 %    % Monocytes 17.2 %    % Eosinophils 0.0 %    % Basophils 0.7 %    Absolute Neutrophil 3.7 1.6 - 8.3 10e9/L    Absolute Lymphocytes 1.4 0.8 - 5.3 10e9/L    Absolute Monocytes 1.1 0.0 - 1.3 10e9/L    Absolute Eosinophils 0.0 0.0 - 0.7 10e9/L    Absolute Basophils 0.0 0.0 - 0.2 10e9/L    Diff Method Automated Method    T4 free   Result Value Ref Range    T4 Free 1.10 0.76 - 1.46 ng/dL     Letter sent.           Your thyroid dosage is slightly low.             You can alternate the 137 with the 125  g doses, on alternate days.    I will send in a prescription for the 137  g dosage strength.        Your other lab results are normal or stable,including the liver,kidney,glucose, hemoglobin, and the iron level.

## 2019-06-11 NOTE — LETTER
June 12, 2019      Jennifertomasz Torres  [id number] [facility] [id number]        Dear ,    We are writing to inform you of your test results.           Your thyroid dosage is slightly low.             You can alternate the 137 with the 125  g doses, on alternate days.    I will send in a prescription for the 137  g dosage strength.        Your other lab results are normal or stable,including the liver,kidney,glucose, hemoglobin, and the iron level.    Results for orders placed or performed in visit on 06/11/19   Comprehensive metabolic panel (BMP + Alb, Alk Phos, ALT, AST, Total. Bili, TP)   Result Value Ref Range    Sodium 141 133 - 144 mmol/L    Potassium 4.0 3.4 - 5.3 mmol/L    Chloride 106 94 - 109 mmol/L    Carbon Dioxide 26 20 - 32 mmol/L    Anion Gap 10 3 - 14 mmol/L    Glucose 97 70 - 99 mg/dL    Urea Nitrogen 34 (H) 7 - 30 mg/dL    Creatinine 1.19 (H) 0.52 - 1.04 mg/dL    GFR Estimate 43 (L) >60 mL/min/[1.73_m2]    GFR Estimate If Black 50 (L) >60 mL/min/[1.73_m2]    Calcium 8.9 8.5 - 10.1 mg/dL    Bilirubin Total 0.7 0.2 - 1.3 mg/dL    Albumin 3.8 3.4 - 5.0 g/dL    Protein Total 7.1 6.8 - 8.8 g/dL    Alkaline Phosphatase 82 40 - 150 U/L    ALT 40 0 - 50 U/L    AST 55 (H) 0 - 45 U/L   Hemoglobin A1c   Result Value Ref Range    Hemoglobin A1C 6.2 (H) 0 - 5.6 %   Ferritin   Result Value Ref Range    Ferritin 59 8 - 252 ng/mL   TSH with free T4 reflex   Result Value Ref Range    TSH 6.91 (H) 0.40 - 4.00 mU/L   CBC with platelets and differential   Result Value Ref Range    WBC 6.1 4.0 - 11.0 10e9/L    RBC Count 3.99 3.8 - 5.2 10e12/L    Hemoglobin 12.4 11.7 - 15.7 g/dL    Hematocrit 36.4 35.0 - 47.0 %    MCV 91 78 - 100 fl    MCH 31.1 26.5 - 33.0 pg    MCHC 34.1 31.5 - 36.5 g/dL    RDW 14.4 10.0 - 15.0 %    Platelet Count 209 150 - 450 10e9/L    % Neutrophils 59.8 %    % Lymphocytes 22.3 %    % Monocytes 17.2 %    % Eosinophils 0.0 %    % Basophils 0.7 %    Absolute Neutrophil 3.7 1.6 - 8.3  10e9/L    Absolute Lymphocytes 1.4 0.8 - 5.3 10e9/L    Absolute Monocytes 1.1 0.0 - 1.3 10e9/L    Absolute Eosinophils 0.0 0.0 - 0.7 10e9/L    Absolute Basophils 0.0 0.0 - 0.2 10e9/L    Diff Method Automated Method    T4 free   Result Value Ref Range    T4 Free 1.10 0.76 - 1.46 ng/dL         If you have any questions or concerns, please call the clinic at the number listed above.       Sincerely,        Rodney Narayanan MD

## 2019-06-12 RX ORDER — LEVOTHYROXINE SODIUM 137 UG/1
TABLET ORAL
Qty: 45 TABLET | Refills: 3 | Status: SHIPPED | OUTPATIENT
Start: 2019-06-12 | End: 2019-08-28

## 2019-06-13 ENCOUNTER — TELEPHONE (OUTPATIENT)
Dept: FAMILY MEDICINE | Facility: CLINIC | Age: 79
End: 2019-06-13

## 2019-06-13 DIAGNOSIS — E03.9 HYPOTHYROIDISM, UNSPECIFIED TYPE: ICD-10-CM

## 2019-06-13 RX ORDER — LEVOTHYROXINE SODIUM 125 UG/1
137 TABLET ORAL DAILY
Qty: 90 TABLET | Refills: 0 | Status: SHIPPED | OUTPATIENT
Start: 2019-06-13 | End: 2019-08-28

## 2019-06-13 NOTE — TELEPHONE ENCOUNTER
[facility] pharmacy was called. They received Rx for levothyroxine (SYNTHROID/LEVOTHROID) 137 MCG tablet but don't have rx for 125 MCG tablet. Rx was approved per protocol.

## 2019-06-13 NOTE — TELEPHONE ENCOUNTER
Reason for Call:  Other call back    Detailed comments: [facility] pharmacy called saying they received an RX for levothyroxine (SYNTHROID/LEVOTHROID) 137 MCG tablet      The prescription says to take the 137MCG every other day with  levothyroxine (SYNTHROID/LEVOTHROID) 125 MCG tablet.    No 125MCG RX refill was sent.     Pharmacy Question: was the 125MCG to be sent for a refill?    Please return a call to [facility].    Phone Number Patient can be reached at:   [employer]:[phone]    Best Time: any    Can we leave a detailed message on this number? YES    Call taken on 6/13/2019 at 8:31 AM by Katty Bowman

## 2019-06-13 NOTE — TELEPHONE ENCOUNTER
Call attempted to pharmacy. Pharmacy is now closed. They open at 10:00 AM. Per chart review, providers lab letter message read-     Your thyroid dosage is slightly low.             You can alternate the 137 with the 125  g doses, on alternate days.    I will send in a prescription for the 137  g dosage strength.

## 2019-06-13 NOTE — TELEPHONE ENCOUNTER
[facility] pharmacsit called to clarify- should pt alternate Rx's for levothyroxine 125 mcg and 137 mcg or take daily. Per review of provider notes below, pharmacist was informed pt should alternate 137 mcg tablet with the 125 microgram strength.

## 2019-06-14 ENCOUNTER — NURSE TRIAGE (OUTPATIENT)
Dept: FAMILY MEDICINE | Facility: CLINIC | Age: 79
End: 2019-06-14

## 2019-06-14 DIAGNOSIS — R73.01 IMPAIRED FASTING GLUCOSE: Primary | ICD-10-CM

## 2019-06-14 DIAGNOSIS — I10 HYPERTENSION GOAL BP (BLOOD PRESSURE) < 140/90: ICD-10-CM

## 2019-06-14 DIAGNOSIS — E03.9 HYPOTHYROIDISM, UNSPECIFIED TYPE: ICD-10-CM

## 2019-06-14 NOTE — TELEPHONE ENCOUNTER
It looks like Dr. Narayanan actually increased her Levothyroxine on 6/11/19... 126 mcg alternating with 137 mcg.  Is she asking to be on a lower dose?  She used to be on 126 mcg daily.  --Dr. Myers

## 2019-06-14 NOTE — TELEPHONE ENCOUNTER
Patient Contact    Attempt # 1    Was call answered?  No.  Left message on voicemail with information to call triage back.    Upon call back,  It looks like Dr. Narayanan actually increased her Levothyroxine on 6/11/19... 126 mcg alternating with 137 mcg.  Is she asking to be on a lower dose?  She used to be on 126 mcg daily.

## 2019-06-14 NOTE — TELEPHONE ENCOUNTER
"Patient complaining of new symptoms: tired, doesn't want to get out of bed or do anything, says she has gained 14 lbs in 5 months even though cutting out bread and sugar. This has happened before when attempted to lower synthroid dose. Pt requesting to go back to previous dosing. Routing to provider to advise if office visit preferred to discuss. Last seen 6/11.     Answer Assessment - Initial Assessment Questions  1. DESCRIPTION: \"Describe how you are feeling.\"      \"Just don't feel good\" Tired, doesn't want to get out of bed or do anything, gained 14 lbs in 5 months, cut out bread and sugar, can't think. Feel like I have brain fog. This happened before when trying to lower medication.   2. SEVERITY: \"How bad is it?\"  \"Can you stand and walk?\"    - MILD - Feels weak or tired, but does not interfere with work, school or normal activities    - MODERATE - Able to stand and walk; weakness interferes with work, school, or normal activities    - SEVERE - Unable to stand or walk  Rates 7-8/10.   3. ONSET:  \"When did the weakness begin?\"      n/a  4. CAUSE: \"What do you think is causing the weakness?\"      Medication change - synthroid  5. MEDICINES: \"Have you recently started a new medicine or had a change in the amount of a medicine?\"      Synthroid  6. OTHER SYMPTOMS: \"Do you have any other symptoms?\" (e.g., chest pain, fever, cough, SOB, vomiting, diarrhea, bleeding, other areas of pain)      Hx of heart disease (nothing new), shortness of breath (nothing new), no new pain    Protocols used: WEAKNESS (GENERALIZED) AND FATIGUE-A-OH    "

## 2019-06-14 NOTE — TELEPHONE ENCOUNTER
Patient states she used to take 137 mcg (per chart review this was last prescribed 1/22/2019). Decreased to 125 mcg in January, and then recently increased back to alternating between 125 mcg and 137 mcg. Patient is requesting to go back to 137 mcg because she felt better. Pt states this happened to her before when they've attempted to lower the dose.

## 2019-06-14 NOTE — TELEPHONE ENCOUNTER
Reason for call:  Patient reporting a symptom    Symptom or request: fatigue and weight gain    Duration (how long have symptoms been present): 4 months    Have you been treated for this before? Yes    Additional comments: Pt says it started when Dr Narayanan lowered her thyroid medication.  Pt declined appt.    Phone Number patient can be reached at:  Home number on file [phone] (home)    Best Time:  any    Can we leave a detailed message on this number:  YES    Call taken on 6/14/2019 at 10:35 AM by KAREN BRIGGS

## 2019-06-14 NOTE — TELEPHONE ENCOUNTER
Patient scheduled for med check 8/20. Requesting to have labs done ahead of time at clinic near [place]. Routing to provider to review/add labs. Route back to triage so we can notify patient to schedule lab only visit.

## 2019-06-14 NOTE — TELEPHONE ENCOUNTER
Contacted patient to relay provider message. Routing to PCP as FYI. When would you like patient follow up?     Route to triage so we can notify patient.

## 2019-06-17 NOTE — TELEPHONE ENCOUNTER
Called patient and informed her that her labs have been placed, and she can schedule with a [place] clinic of her choice. No further action at this time.

## 2019-07-01 ENCOUNTER — TRANSFERRED RECORDS (OUTPATIENT)
Dept: HEALTH INFORMATION MANAGEMENT | Facility: CLINIC | Age: 79
End: 2019-07-01

## 2019-07-02 ENCOUNTER — OFFICE VISIT (OUTPATIENT)
Dept: FAMILY MEDICINE | Facility: CLINIC | Age: 79
End: 2019-07-02
Payer: COMMERCIAL

## 2019-07-02 VITALS
BODY MASS INDEX: 32.66 KG/M2 | RESPIRATION RATE: 16 BRPM | WEIGHT: 173 LBS | HEIGHT: 61 IN | DIASTOLIC BLOOD PRESSURE: 70 MMHG | HEART RATE: 71 BPM | OXYGEN SATURATION: 100 % | SYSTOLIC BLOOD PRESSURE: 132 MMHG | TEMPERATURE: 98.2 F

## 2019-07-02 DIAGNOSIS — E03.9 HYPOTHYROIDISM, UNSPECIFIED TYPE: ICD-10-CM

## 2019-07-02 DIAGNOSIS — H25.9 AGE-RELATED CATARACT OF BOTH EYES, UNSPECIFIED AGE-RELATED CATARACT TYPE: ICD-10-CM

## 2019-07-02 DIAGNOSIS — D64.9 ANEMIA, UNSPECIFIED TYPE: ICD-10-CM

## 2019-07-02 DIAGNOSIS — Z01.818 PREOP GENERAL PHYSICAL EXAM: Primary | ICD-10-CM

## 2019-07-02 DIAGNOSIS — I10 HYPERTENSION GOAL BP (BLOOD PRESSURE) < 140/90: ICD-10-CM

## 2019-07-02 PROCEDURE — 93000 ELECTROCARDIOGRAM COMPLETE: CPT | Performed by: PHYSICIAN ASSISTANT

## 2019-07-02 PROCEDURE — 99214 OFFICE O/P EST MOD 30 MIN: CPT | Mod: 25 | Performed by: PHYSICIAN ASSISTANT

## 2019-07-02 ASSESSMENT — ANXIETY QUESTIONNAIRES
2. NOT BEING ABLE TO STOP OR CONTROL WORRYING: NOT AT ALL
GAD7 TOTAL SCORE: 0
5. BEING SO RESTLESS THAT IT IS HARD TO SIT STILL: NOT AT ALL
GAD7 TOTAL SCORE: 0
1. FEELING NERVOUS, ANXIOUS, OR ON EDGE: NOT AT ALL
7. FEELING AFRAID AS IF SOMETHING AWFUL MIGHT HAPPEN: NOT AT ALL
GAD7 TOTAL SCORE: 0
3. WORRYING TOO MUCH ABOUT DIFFERENT THINGS: NOT AT ALL
7. FEELING AFRAID AS IF SOMETHING AWFUL MIGHT HAPPEN: NOT AT ALL
4. TROUBLE RELAXING: NOT AT ALL
6. BECOMING EASILY ANNOYED OR IRRITABLE: NOT AT ALL

## 2019-07-02 ASSESSMENT — PATIENT HEALTH QUESTIONNAIRE - PHQ9
10. IF YOU CHECKED OFF ANY PROBLEMS, HOW DIFFICULT HAVE THESE PROBLEMS MADE IT FOR YOU TO DO YOUR WORK, TAKE CARE OF THINGS AT HOME, OR GET ALONG WITH OTHER PEOPLE: VERY DIFFICULT
SUM OF ALL RESPONSES TO PHQ QUESTIONS 1-9: 5
SUM OF ALL RESPONSES TO PHQ QUESTIONS 1-9: 5

## 2019-07-02 ASSESSMENT — ENCOUNTER SYMPTOMS
GASTROINTESTINAL NEGATIVE: 1
CONSTITUTIONAL NEGATIVE: 1
RESPIRATORY NEGATIVE: 1
MYALGIAS: 1
NEUROLOGICAL NEGATIVE: 1
ARTHRALGIAS: 1
PSYCHIATRIC NEGATIVE: 1

## 2019-07-02 ASSESSMENT — MIFFLIN-ST. JEOR: SCORE: 1197.1

## 2019-07-02 NOTE — PROGRESS NOTES
[facility]  [address]  [facility] [address]  [phone]  Dept: [phone]    PRE-OP EVALUATION:  Today's date: 2019    Jennifer Torres (: 1940) presents for pre-operative evaluation assessment as requested by Dr. Zapien.  She requires evaluation and anesthesia risk assessment prior to undergoing surgery/procedure for treatment of rt eye first then lt eye .    Fax number for surgical facility: [phone]  Primary Physician: Rodney Narayanan  Type of Anesthesia Anticipated: Local with MAC    Patient has a Health Care Directive or Living Will:  YES     Preop Questions 2019   Who is doing your surgery? obrban dr zapien   What are you having done? cataracts   Date of Surgery/Procedure: 2019   Facility or Hospital where procedure/surgery will be performed: [facility]   1.  Do you have a history of Heart attack, stroke, stent, coronary bypass surgery, or other heart surgery? YES  - stable angina   2.  Do you ever have any pain or discomfort in your chest? YES - stable angina   3.  Do you have a history of  Heart Failure? No   4.   Are you troubled by shortness of breath when:  walking on a level surface, or up a slight hill, or at night? YES - stable angina   5.  Do you currently have a cold, bronchitis or other respiratory infection? No   6.  Do you have a cough, shortness of breath, or wheezing? No   7.  Do you sometimes get pains in the calves of your legs when you walk? YES - related to chronic leg pain   8. Do you or anyone in your family have previous history of blood clots? No   9.  Do you or does anyone in your family have a serious bleeding problem such as prolonged bleeding following surgeries or cuts? No   10. Have you ever had problems with anemia or been told to take iron pills? YES   11. Have you had any abnormal blood loss such as black, tarry or bloody stools, or abnormal vaginal bleeding? No   12.  Have you ever had a blood transfusion? No   13. Have you or any of your relatives ever had problems with anesthesia? No   14. Do you have sleep apnea, excessive snoring or daytime drowsiness? YES   15. Do you have any prosthetic heart valves? No   16. Do you have prosthetic joints? YES   17. Is there any chance that you may be pregnant? No         HPI:     HPI related to upcoming procedure: cataract surgery scheduled to improve vision.  Gradual worsening vision over several years due to cataracts.        MEDICAL HISTORY:     Patient Active Problem List    Diagnosis Date Noted     Chronic pain syndrome 01/13/2016     Priority: High      checked 08/7/18   No controlled substance agreement on file./       Hyperlipidemia with target LDL less than 100 12/24/2013     Priority: High     Diagnosis updated by automated process. Provider to review and confirm.       SOB (shortness of breath) 07/09/2013     Priority: High     See cardiology letter 6/13 and 9/13; PFT's nml except some air trapping; pt reports MDI did not help       Lumbar spinal stenosis 04/02/2013     Priority: High     Impaired fasting glucose      Priority: High     , A1C 7.8 in 4/13; add metformin.  7/13, A1C down to 6.1 ; 115 and 6.3 in 12/13; 82 in 12/14;     110 and 6.3 in 1/16; 107 and 6.0 in 1/17 , 98 and 5.9 in 1/18       Coronary atherosclerosis      Priority: High     LAD stent 2008;              See cardiology note 3/13; pt has ? Anginal sx; also severe DILLON; in 9/13,  cor angio showed multiple 50% lesions           Abnormal weight gain 06/11/2019     Priority: Medium     Gastroesophageal reflux disease without esophagitis 01/22/2019     Priority: Medium     Anemia, unspecified type 10/17/2018     Priority: Medium     NSAID long-term use 10/17/2018     Priority: Medium     Erythrocytosis 01/18/2018     Priority: Medium     hgb 17.3 in 1/18       Hypothyroidism, unspecified type 01/17/2018     Priority: Medium     MENDEL (obstructive sleep  "apnea) 2018     Priority: Medium     Dx 2017?; is using CPAP       Routine general medical examination at a health care facility 01/10/2017     Priority: Medium     PAF (paroxysmal atrial fibrillation) (H) 2016     Priority: Medium     Ablation and amiodarone and warfarin in ; bradycardia in ; pacemaker placed.              amio stopped in 10/16.     Warfarin stopped by Cardiology in 10/16, and restarted by them in          Grief reaction 2015     Priority: Medium     Son  age 53       Rib fractures 2015     Priority: Medium     Generalized anxiety disorder 2013     Priority: Medium     Diagnosis updated by automated process. Provider to review and confirm.       Hypertension goal BP (blood pressure) < 140/90 2013     Priority: Medium     Chronic low back pain 2013     Priority: Medium     PHQ-9 = 10 in        Spinal fusion failure (H) 2013     Priority: Medium     Iron deficiency anemia 2013     Priority: Medium     Took iron for 2 months late ; and in .           In , ferritin 12; in , up to 25 with Fe;  In , ferritin only 27 but Hgb up to 13.7; FIT neg in . Not taking Fe in ;  56 and 14;         43 and 13 in         25 and 13.2 in ; resume Fe QOD. Not taking Fe in ; 58 and 17.3 in    (hgb 17.3 is an error);         13.1 and 46 in ;  Add a MVI with Fe       Fibromyalgia      Priority: Medium     Sees Dr. Valverde; he retired        Chronic venous insufficiency      Priority: Medium     Preventive measure 2013     Priority: Low     APRIMA DATA BASE UNDER THE 12 NOTE  Colonoscopy   FIT neg in         Colonoscopy neg in         Mammogram ,  ,                Bone density \"normal\"  ; ERT 4998-7467           Health Care Home 10/03/2012     Priority: Low     Cheryl Estrada, BS, RN, PHN  FPA    [phone]      DX V65.8 REPLACED WITH [id number] [place] [address] " CARE HOME (04/08/2013)          Past Medical History:   Diagnosis Date     Chronic venous insufficiency      Coronary atherosclerosis of unspecified type of vessel, native or graft     LAD stent 2008     Fibromyalgia      Impaired fasting glucose      Other and unspecified hyperlipidemia      Unspecified hypothyroidism     Hypothyroidism     Past Surgical History:   Procedure Laterality Date     APPENDECTOMY  1956     BACK SURGERY  9/12    lumbar spine fusion; Dr. Marvel BECKHAM TOTAL KNEE ARTHROPLASTY Bilateral 2004,2005     CARPAL TUNNEL RELEASE RT/LT Bilateral 1974     CHOLECYSTECTOMY  2001     HYSTERECTOMY  1977    and L oophorectomy     Current Outpatient Medications   Medication Sig Dispense Refill     aspirin 81 MG tablet Take 1 tablet by mouth daily       atorvastatin (LIPITOR) 40 MG tablet Take 1 tablet (40 mg) by mouth daily 90 tablet 3     bisacodyl (DULCOLAX) 5 MG EC tablet Take 5-10 mg by mouth       Blood Glucose Monitoring Suppl (BLOOD GLUCOSE SYSTEM YARELY) KIT Dispense meter, test strips, lancets covered by pt ins. 250.00 NIDDM type II - Test 1 time/day       bumetanide (BUMEX) 1 MG tablet Take 1 tablet (1 mg) by mouth 2 times daily 180 tablet 3     busPIRone (BUSPAR) 10 MG tablet Take 1 tablet (10 mg) by mouth 2 times daily 180 tablet 3     Cholecalciferol (VITAMIN D) 2000 UNITS CAPS Take 2 capsules by mouth daily.       Cyanocobalamin (B-12) 500 MCG TABS  150 tablet      fish oil-omega-3 fatty acids (FISH OIL) 1000 MG capsule Take 1 g by mouth daily.       levothyroxine (SYNTHROID/LEVOTHROID) 125 MCG tablet Take 1 tablet (125 mcg) by mouth daily 90 tablet 0     levothyroxine (SYNTHROID/LEVOTHROID) 137 MCG tablet Take 1 tablet every other day, alternating with the 125 microgram strength 45 tablet 3     metoprolol tartrate (LOPRESSOR) 50 MG tablet Take 1 tablet (50 mg) by mouth 2 times daily 180 tablet 3     Multiple Vitamins-Minerals (MULTIVITAMIN OR) Take 1 tablet by mouth daily.        "nitroglycerin (NITROSTAT) 0.4 MG SL tablet Place 0.4 mg under the tongue       omeprazole (PRILOSEC) 20 MG DR capsule Take 1 capsule (20 mg) by mouth daily 90 capsule 3     spironolactone (ALDACTONE) 25 MG tablet Take 1 tablet (25 mg) by mouth daily 90 tablet 3     traMADol (ULTRAM) 50 MG tablet TAKE 1 TABLET BY MOUTH 3 TIMES DAILY 270 tablet 3     warfarin (COUMADIN) 2.5 MG tablet As of 1/22/19, she takes 2.5 mg on 4 days per week, and 5 mg on 3 days per week. 50 tablet 4     warfarin (COUMADIN) 5 MG tablet As of 1/22/19, she take 5 mg 3 days per week, and 2.5 mg on 4 days per week. 40 tablet 4     OTC products: None, except as noted above    No Known Allergies   Latex Allergy: NO    Social History     Tobacco Use     Smoking status: Never Smoker     Smokeless tobacco: Never Used   Substance Use Topics     Alcohol use: Yes     Comment: occ.     History   Drug Use No       REVIEW OF SYSTEMS:   Review of Systems   Constitutional: Negative.    HENT: Negative.    Eyes:        As in HPI   Respiratory: Negative.    Cardiovascular:        As in HPI   Gastrointestinal: Negative.    Genitourinary: Negative.    Musculoskeletal: Positive for arthralgias and myalgias.   Skin: Negative.    Neurological: Negative.    Psychiatric/Behavioral: Negative.      Patient has history of angina and follows cardiology.  She is currently taking warfarin, however due to the nature of this surgery there is no need to stop it.     EXAM:   /70 (Cuff Size: Adult Large)   Pulse 71   Temp 98.2  F (36.8  C) (Tympanic)   Resp 16   Ht 1.549 m (5' 1\")   Wt 78.5 kg (173 lb)   SpO2 100%   BMI 32.69 kg/m    Physical Exam   Constitutional: She is oriented to person, place, and time. She appears well-developed and well-nourished. No distress.   HENT:   Head: Normocephalic.   Right Ear: External ear normal.   Left Ear: External ear normal.   Nose: Nose normal.   Eyes: Conjunctivae and EOM are normal.   Neck: Normal range of motion. "   Cardiovascular: Normal rate, regular rhythm and normal heart sounds.   Pulmonary/Chest: Effort normal and breath sounds normal.   Neurological: She is alert and oriented to person, place, and time.   Skin: She is not diaphoretic.   Psychiatric: She has a normal mood and affect. Judgment normal.       DIAGNOSTICS:   EKG: normal axis, normal intervals, no acute ST/T changes c/w ischemia, no LVH by voltage criteria, unchanged from previous tracings    Recent Labs   Lab Test 06/11/19  0837 01/22/19  1053 10/04/18  0947  05/12/16 05/11/16   HGB 12.4 13.1 12.3   < >  --   --     243 235   < >  --   --    INR  --   --   --   --  1.5 2.8     --  138   < >  --   --    POTASSIUM 4.0  --  4.7   < > 4.2  --    CR 1.19*  --  1.41*   < > 0.97 1.37   A1C 6.2* 5.9*  --    < >  --   --     < > = values in this interval not displayed.        IMPRESSION:   Reason for surgery/procedure: bilateral cataracts    The proposed surgical procedure is considered LOW risk.    REVISED CARDIAC RISK INDEX  The patient has the following serious cardiovascular risks for perioperative complications such as (MI, PE, VFib and 3  AV Block):  Coronary Artery Disease (MI, positive stress test, angina, Qs on EKG)  INTERPRETATION: 1 risks: Class II (low risk - 0.9% complication rate)    The patient has the following additional risks for perioperative complications:  No identified additional risks      ICD-10-CM    1. Preop general physical exam Z01.818 EKG 12-lead complete w/read - Clinics   2. Hypothyroidism, unspecified type E03.9    3. Hypertension goal BP (blood pressure) < 140/90 I10    4. Anemia, unspecified type D64.9        RECOMMENDATIONS:       --Patient is to take all scheduled medications on the day of surgery EXCEPT for modifications listed below.    APPROVAL GIVEN to proceed with proposed procedure, without further diagnostic evaluation       Signed Electronically by: Effie Stevens PA-C    Copy of this evaluation  report is provided to requesting physician.    [facility] Preop Guidelines    Revised Cardiac Risk Index

## 2019-07-03 ASSESSMENT — ANXIETY QUESTIONNAIRES: GAD7 TOTAL SCORE: 0

## 2019-07-25 ENCOUNTER — TRANSFERRED RECORDS (OUTPATIENT)
Dept: HEALTH INFORMATION MANAGEMENT | Facility: CLINIC | Age: 79
End: 2019-07-25

## 2019-07-29 DIAGNOSIS — M79.7 FIBROMYALGIA: ICD-10-CM

## 2019-07-29 DIAGNOSIS — G89.4 CHRONIC PAIN SYNDROME: ICD-10-CM

## 2019-07-29 NOTE — TELEPHONE ENCOUNTER
Controlled Substance Refill Request for traMADol (ULTRAM) 50 MG tablet  Problem List Complete:  Yes     checked 08/7/18   No controlled substance agreement on file./    Last Written Prescription Date:  1/22/2019  Last Fill Quantity: 270 tablet,  # refills: 3   Last office visit: 7/2/2019 with prescribing provider:  Fitterer   Future Office Visit:   Next 5 appointments (look out 90 days)    Aug 20, 2019 11:00 AM CDT  SHORT with Rodney Narayanan MD  [facility] ([facility]) [address] [place] [address]  [phone]             Controlled substance agreement:   Encounter-Level CSA:    There are no encounter-level csa.     Patient-Level CSA:    There are no patient-level csa.         Last Urine Drug Screen: No results found for: CDAUT, No results found for: COMDAT, No results found for: THC13, PCP13, COC13, MAMP13, OPI13, AMP13, BZO13, TCA13, MTD13, BAR13, OXY13, PPX13, BUP13     Processing:  Patient will  in clinic    [email or web address]   checked in past 3 months?  No, route to RN

## 2019-07-30 RX ORDER — TRAMADOL HYDROCHLORIDE 50 MG/1
TABLET ORAL
Qty: 270 TABLET | Refills: 2 | Status: SHIPPED | OUTPATIENT
Start: 2019-07-30 | End: 2019-08-07

## 2019-07-30 NOTE — TELEPHONE ENCOUNTER
[place] reviewed:  reviewed- no concerns; last fill 5/6/19    [place]  [email or web address]  Routing refill request to provider for review/approval because:  Drug not on the [facility] refill protocol

## 2019-08-07 ENCOUNTER — TELEPHONE (OUTPATIENT)
Dept: FAMILY MEDICINE | Facility: CLINIC | Age: 79
End: 2019-08-07

## 2019-08-07 NOTE — TELEPHONE ENCOUNTER
Reason for Call:  Medication or medication refill:    Do you use a [facility]?  Name of the pharmacy and phone number for the current request:  [facility]    Name of the medication requested: tramadol 50 mg    Other request: pt has rx but is .  Needs new rx due to to restrictions.     Can we leave a detailed message on this number? YES    Phone number patient can be reached at: Home number on file [phone] (home)    Best Time: any    Call taken on 2019 at 10:11 AM by KAREN BRIGGS

## 2019-08-07 NOTE — TELEPHONE ENCOUNTER
Spoke with pharmacy and confirmed that patient has Rx for tramadol that was written on 7/30 for 270 tablets with 2 refills. Due to the new law, her refills will not be valid when is time for refill. New prescription will have to be written.

## 2019-08-08 DIAGNOSIS — E03.9 HYPOTHYROIDISM, UNSPECIFIED TYPE: ICD-10-CM

## 2019-08-08 DIAGNOSIS — R73.01 IMPAIRED FASTING GLUCOSE: ICD-10-CM

## 2019-08-08 DIAGNOSIS — I10 HYPERTENSION GOAL BP (BLOOD PRESSURE) < 140/90: ICD-10-CM

## 2019-08-08 LAB
ANION GAP SERPL CALCULATED.3IONS-SCNC: 6 MMOL/L (ref 3–14)
BUN SERPL-MCNC: 23 MG/DL (ref 7–30)
CALCIUM SERPL-MCNC: 8.6 MG/DL (ref 8.5–10.1)
CHLORIDE SERPL-SCNC: 104 MMOL/L (ref 94–109)
CO2 SERPL-SCNC: 26 MMOL/L (ref 20–32)
CREAT SERPL-MCNC: 1 MG/DL (ref 0.52–1.04)
GFR SERPL CREATININE-BSD FRML MDRD: 53 ML/MIN/{1.73_M2}
GLUCOSE SERPL-MCNC: 112 MG/DL (ref 70–99)
POTASSIUM SERPL-SCNC: 3.6 MMOL/L (ref 3.4–5.3)
SODIUM SERPL-SCNC: 136 MMOL/L (ref 133–144)
TSH SERPL DL<=0.005 MIU/L-ACNC: 0.72 MU/L (ref 0.4–4)

## 2019-08-08 PROCEDURE — 80048 BASIC METABOLIC PNL TOTAL CA: CPT | Performed by: INTERNAL MEDICINE

## 2019-08-08 PROCEDURE — 84443 ASSAY THYROID STIM HORMONE: CPT | Performed by: INTERNAL MEDICINE

## 2019-08-08 PROCEDURE — 36415 COLL VENOUS BLD VENIPUNCTURE: CPT | Performed by: INTERNAL MEDICINE

## 2019-08-28 ENCOUNTER — OFFICE VISIT (OUTPATIENT)
Dept: FAMILY MEDICINE | Facility: CLINIC | Age: 79
End: 2019-08-28
Payer: COMMERCIAL

## 2019-08-28 VITALS
HEART RATE: 72 BPM | HEIGHT: 61 IN | BODY MASS INDEX: 33.04 KG/M2 | TEMPERATURE: 97.5 F | OXYGEN SATURATION: 99 % | SYSTOLIC BLOOD PRESSURE: 124 MMHG | WEIGHT: 175 LBS | DIASTOLIC BLOOD PRESSURE: 70 MMHG | RESPIRATION RATE: 20 BRPM

## 2019-08-28 DIAGNOSIS — E87.6 HYPOKALEMIA: ICD-10-CM

## 2019-08-28 DIAGNOSIS — E03.9 HYPOTHYROIDISM, UNSPECIFIED TYPE: Primary | ICD-10-CM

## 2019-08-28 DIAGNOSIS — G47.00 INSOMNIA, UNSPECIFIED TYPE: ICD-10-CM

## 2019-08-28 DIAGNOSIS — I10 ESSENTIAL HYPERTENSION WITH GOAL BLOOD PRESSURE LESS THAN 130/80: ICD-10-CM

## 2019-08-28 PROCEDURE — 99213 OFFICE O/P EST LOW 20 MIN: CPT | Performed by: INTERNAL MEDICINE

## 2019-08-28 RX ORDER — POTASSIUM CHLORIDE 750 MG/1
10 TABLET, EXTENDED RELEASE ORAL DAILY
Qty: 90 TABLET | Refills: 3 | Status: SHIPPED | OUTPATIENT
Start: 2019-08-28 | End: 2020-02-04

## 2019-08-28 RX ORDER — TRAZODONE HYDROCHLORIDE 50 MG/1
50 TABLET, FILM COATED ORAL AT BEDTIME
Qty: 90 TABLET | Refills: 3 | Status: SHIPPED | OUTPATIENT
Start: 2019-08-28 | End: 2020-02-04

## 2019-08-28 RX ORDER — LEVOTHYROXINE SODIUM 137 UG/1
137 TABLET ORAL DAILY
Qty: 90 TABLET | Refills: 3 | Status: SHIPPED | OUTPATIENT
Start: 2019-08-28 | End: 2020-02-04

## 2019-08-28 RX ORDER — LEVOTHYROXINE SODIUM 137 UG/1
137 TABLET ORAL DAILY
Qty: 90 TABLET | Refills: 3 | COMMUNITY
Start: 2019-08-28 | End: 2019-08-28

## 2019-08-28 ASSESSMENT — PATIENT HEALTH QUESTIONNAIRE - PHQ9
SUM OF ALL RESPONSES TO PHQ QUESTIONS 1-9: 6
5. POOR APPETITE OR OVEREATING: NOT AT ALL
SUM OF ALL RESPONSES TO PHQ QUESTIONS 1-9: 6
10. IF YOU CHECKED OFF ANY PROBLEMS, HOW DIFFICULT HAVE THESE PROBLEMS MADE IT FOR YOU TO DO YOUR WORK, TAKE CARE OF THINGS AT HOME, OR GET ALONG WITH OTHER PEOPLE: NOT DIFFICULT AT ALL

## 2019-08-28 ASSESSMENT — ANXIETY QUESTIONNAIRES
1. FEELING NERVOUS, ANXIOUS, OR ON EDGE: NOT AT ALL
6. BECOMING EASILY ANNOYED OR IRRITABLE: NOT AT ALL
3. WORRYING TOO MUCH ABOUT DIFFERENT THINGS: NOT AT ALL
IF YOU CHECKED OFF ANY PROBLEMS ON THIS QUESTIONNAIRE, HOW DIFFICULT HAVE THESE PROBLEMS MADE IT FOR YOU TO DO YOUR WORK, TAKE CARE OF THINGS AT HOME, OR GET ALONG WITH OTHER PEOPLE: NOT DIFFICULT AT ALL
5. BEING SO RESTLESS THAT IT IS HARD TO SIT STILL: NOT AT ALL
2. NOT BEING ABLE TO STOP OR CONTROL WORRYING: NOT AT ALL

## 2019-08-28 ASSESSMENT — MIFFLIN-ST. JEOR: SCORE: 1201.17

## 2019-08-28 NOTE — PROGRESS NOTES
Subjective  Answers for HPI/ROS submitted by the patient on 8/28/2019   If you checked off any problems, how difficult have these problems made it for you to do your work, take care of things at home, or get along with other people?: Not difficult at all  PHQ9 TOTAL SCORE: 6      Jennifer Torres is a 79 year old female who presents to clinic today for the following health issues:    HPI   Anxiety Follow-Up    How are you doing with your anxiety since your last visit? No change    Are you having other symptoms that might be associated with anxiety? Yes:  Insomnia- would like to try Trazadone again    Have you had a significant life event? No     Are you feeling depressed? No    Do you have any concerns with your use of alcohol or other drugs? No    Social History     Tobacco Use     Smoking status: Never Smoker     Smokeless tobacco: Never Used   Substance Use Topics     Alcohol use: Yes     Comment: occ.     Drug use: No     NOEMÍ-7 SCORE 7/2/2019   Total Score 0 (minimal anxiety)   Total Score 0     PHQ 7/2/2019 8/28/2019   PHQ-9 Total Score 5 6   Q9: Thoughts of better off dead/self-harm past 2 weeks Not at all Not at all     No flowsheet data found.    Hypothyroidism Follow-up      Since last visit, patient describes the following symptoms: Weight stable, no hair loss, no skin changes, no constipation, no loose stools        How many servings of fruits and vegetables do you eat daily?  2-3    On average, how many sweetened beverages do you drink each day (soda, juice, sweet tea, etc)?   1    How many days per week do you miss taking your medication? 0        Medication Followup of other medications    Taking Medication as prescribed: yes    Side Effects:  None    Medication Helping Symptoms:  yes           Here for lab review.     Taking 137 mcg levothyroxine per day.   No KCL currently.            Wants a trazodone refill; has not taken for > 2 yrs.              Current Outpatient Medications   Medication Sig  Dispense Refill     aspirin 81 MG tablet Take 1 tablet by mouth daily       atorvastatin (LIPITOR) 40 MG tablet Take 1 tablet (40 mg) by mouth daily 90 tablet 3     bisacodyl (DULCOLAX) 5 MG EC tablet Take 5-10 mg by mouth as needed        Blood Glucose Monitoring Suppl (BLOOD GLUCOSE SYSTEM YARELY) KIT Dispense meter, test strips, lancets covered by pt ins. 250.00 NIDDM type II - Test 1 time/day       bumetanide (BUMEX) 1 MG tablet Take 1 tablet (1 mg) by mouth 2 times daily 180 tablet 3     busPIRone (BUSPAR) 10 MG tablet Take 1 tablet (10 mg) by mouth 2 times daily 180 tablet 3     Cholecalciferol (VITAMIN D) 2000 UNITS CAPS Take 2 capsules by mouth daily.       Cyanocobalamin (B-12) 500 MCG TABS  150 tablet      fish oil-omega-3 fatty acids (FISH OIL) 1000 MG capsule Take 1 g by mouth daily.       levothyroxine (SYNTHROID/LEVOTHROID) 137 MCG tablet Take 1 tablet (137 mcg) by mouth daily 90 tablet 3     metoprolol tartrate (LOPRESSOR) 50 MG tablet Take 1 tablet (50 mg) by mouth 2 times daily 180 tablet 3     Multiple Vitamins-Minerals (MULTIVITAMIN OR) Take 1 tablet by mouth daily.       nitroglycerin (NITROSTAT) 0.4 MG SL tablet Place 0.4 mg under the tongue       omeprazole (PRILOSEC) 20 MG DR capsule Take 1 capsule (20 mg) by mouth daily 90 capsule 3     spironolactone (ALDACTONE) 25 MG tablet Take 1 tablet (25 mg) by mouth daily 90 tablet 3     traMADol (ULTRAM) 50 MG tablet TAKE 1 TABLET BY MOUTH 3 TIMES DAILY 90 tablet 0     warfarin (COUMADIN) 2.5 MG tablet As of 1/22/19, she takes 2.5 mg on 4 days per week, and 5 mg on 3 days per week. 50 tablet 4     warfarin (COUMADIN) 5 MG tablet As of 1/22/19, she take 5 mg 3 days per week, and 2.5 mg on 4 days per week. 40 tablet 4     No Known Allergies  BP Readings from Last 3 Encounters:   07/02/19 132/70   06/11/19 130/70   01/22/19 138/74    Wt Readings from Last 3 Encounters:   07/02/19 78.5 kg (173 lb)   06/11/19 80.3 kg (177 lb)   01/22/19 73.9 kg (163 lb)     "                  Reviewed and updated as needed this visit by Provider         Review of Systems   ROS COMP: CONSTITUTIONAL:NEGATIVE for fever, chills, change in weight  RESP:NEGATIVE for significant cough or SOB  CV: NEGATIVE for chest pain/chest pressure  MUSCULOSKELETAL: arthralgias , back pain and neck pain      Objective    /70 (BP Location: Left arm, Patient Position: Chair, Cuff Size: Adult Large)   Pulse 72   Temp 97.5  F (36.4  C)   Resp 20   Ht 1.549 m (5' 1\")   Wt 79.4 kg (175 lb)   SpO2 99%   BMI 33.07 kg/m    Body mass index is 33.07 kg/m .  Physical Exam   GENERAL APPEARANCE: alert, no distress and fatigued  CV: pitting B/L LE edema to trace    Diagnostic Test Results:  Results for orders placed or performed in visit on 08/08/19   Basic metabolic panel  (Ca, Cl, CO2, Creat, Gluc, K, Na, BUN)   Result Value Ref Range    Sodium 136 133 - 144 mmol/L    Potassium 3.6 3.4 - 5.3 mmol/L    Chloride 104 94 - 109 mmol/L    Carbon Dioxide 26 20 - 32 mmol/L    Anion Gap 6 3 - 14 mmol/L    Glucose 112 (H) 70 - 99 mg/dL    Urea Nitrogen 23 7 - 30 mg/dL    Creatinine 1.00 0.52 - 1.04 mg/dL    GFR Estimate 53 (L) >60 mL/min/[1.73_m2]    GFR Estimate If Black 62 >60 mL/min/[1.73_m2]    Calcium 8.6 8.5 - 10.1 mg/dL   **TSH with free T4 reflex FUTURE anytime   Result Value Ref Range    TSH 0.72 0.40 - 4.00 mU/L           Assessment & Plan     Jennifer was seen today for anxiety, thyroid problem and recheck medication.    Diagnoses and all orders for this visit:    Hypothyroidism, unspecified type  -     levothyroxine (SYNTHROID/LEVOTHROID) 137 MCG tablet; Take 1 tablet (137 mcg) by mouth daily    Essential hypertension with goal blood pressure less than 130/80  -     potassium chloride ER (K-TAB/KLOR-CON) 10 MEQ CR tablet; Take 1 tablet (10 mEq) by mouth daily    Insomnia, unspecified type  -     traZODone (DESYREL) 50 MG tablet; Take 1 tablet (50 mg) by mouth At Bedtime The dose can be increased to 75 or " "100 mg    Hypokalemia  -     potassium chloride ER (K-TAB/KLOR-CON) 10 MEQ CR tablet; Take 1 tablet (10 mEq) by mouth daily         BMI:   Estimated body mass index is 32.69 kg/m  as calculated from the following:    Height as of 7/2/19: 1.549 m (5' 1\").    Weight as of 7/2/19: 78.5 kg (173 lb).   Weight management plan: Discussed healthy diet and exercise guidelines        Summary and implications:    Patient Instructions   You can take trazodone 50 or 75 or 100 mg in the evening.               Resume taking potassium chloride , one tablet per day.                              You can keep taking the 137 mcg dose of your thyroid medication.          Return in about 6 months (around 2/28/2020) for BP Recheck, leg edema, thyroid labs.    Rodney Narayanan MD  [facility]      "

## 2019-08-28 NOTE — PATIENT INSTRUCTIONS
You can take trazodone 50 or 75 or 100 mg in the evening.               Resume taking potassium chloride , one tablet per day.                              You can keep taking the 137 mcg dose of your thyroid medication.

## 2019-10-01 ENCOUNTER — HEALTH MAINTENANCE LETTER (OUTPATIENT)
Age: 79
End: 2019-10-01

## 2019-10-01 ENCOUNTER — TRANSFERRED RECORDS (OUTPATIENT)
Dept: HEALTH INFORMATION MANAGEMENT | Facility: CLINIC | Age: 79
End: 2019-10-01

## 2019-10-21 ENCOUNTER — TRANSFERRED RECORDS (OUTPATIENT)
Dept: HEALTH INFORMATION MANAGEMENT | Facility: CLINIC | Age: 79
End: 2019-10-21

## 2019-10-30 ENCOUNTER — TRANSFERRED RECORDS (OUTPATIENT)
Dept: HEALTH INFORMATION MANAGEMENT | Facility: CLINIC | Age: 79
End: 2019-10-30

## 2019-12-04 ENCOUNTER — TRANSFERRED RECORDS (OUTPATIENT)
Dept: HEALTH INFORMATION MANAGEMENT | Facility: CLINIC | Age: 79
End: 2019-12-04

## 2020-01-08 DIAGNOSIS — M79.7 FIBROMYALGIA: ICD-10-CM

## 2020-01-08 DIAGNOSIS — G89.4 CHRONIC PAIN SYNDROME: ICD-10-CM

## 2020-01-08 RX ORDER — TRAMADOL HYDROCHLORIDE 50 MG/1
TABLET ORAL
Qty: 90 TABLET | Refills: 5 | Status: SHIPPED | OUTPATIENT
Start: 2020-01-08 | End: 2020-02-04

## 2020-01-08 NOTE — TELEPHONE ENCOUNTER
Controlled Substance Refill Request for traMADol (ULTRAM) 50 MG tablet  Problem List Complete:  Yes     checked 12/06/2019 no concerns- last fill on 5/6/19 for 270 tablets (90 day supply)  No controlled substance agreement on file.      Last Written Prescription Date:  12/6/2019  Last Fill Quantity: 90 tablet,  # refills: 0   Last office visit: 8/28/2019 with prescribing provider:  Oracio   Future Office Visit:   Next 5 appointments (look out 90 days)    Feb 04, 2020  8:30 AM CST  PHYSICAL with [facility] [address] [place] [id number] [phone]             Controlled substance agreement:   Encounter-Level CSA:    There are no encounter-level csa.     Patient-Level CSA:    There are no patient-level csa.         Last Urine Drug Screen: No results found for: CDAUT, No results found for: COMDAT, No results found for: THC13, PCP13, COC13, MAMP13, OPI13, AMP13, BZO13, TCA13, MTD13, BAR13, OXY13, PPX13, BUP13         [email or web address]   checked in past 3 months?  Yes 12/6/2019

## 2020-01-28 DIAGNOSIS — I87.2 CHRONIC VENOUS INSUFFICIENCY: ICD-10-CM

## 2020-01-28 DIAGNOSIS — I48.0 PAF (PAROXYSMAL ATRIAL FIBRILLATION) (H): ICD-10-CM

## 2020-01-28 DIAGNOSIS — I10 HYPERTENSION GOAL BP (BLOOD PRESSURE) < 140/90: ICD-10-CM

## 2020-01-28 NOTE — TELEPHONE ENCOUNTER
"Requested Prescriptions   Pending Prescriptions Disp Refills     warfarin ANTICOAGULANT (COUMADIN) 5 MG tablet [Pharmacy Med Name: Warfarin Sodium Oral Tablet 5 MG]  Last Written Prescription Date:  1/22/2019  Last Fill Quantity: 40 tablet,  # refills: 4   Last office visit: 8/28/2019 with prescribing provider:  Oracio   Future Office Visit:   Next 5 appointments (look out 90 days)    Feb 04, 2020  8:30 AM CST  PHYSICAL with Rodney Narayanan MD  [facility] ([facility]) [address] [place] [id number] [phone]          30 tablet 3     Sig: TAKE 1 TABLET (5 MG) BY MOUTH DAILY 3 DAYS A WEEK AND 1/2 TABLET (2.5 MG) DAILY 4 DAYS WEEKLY.       Vitamin K Antagonists Failed - 1/28/2020  2:40 PM        Failed - INR is within goal in the past 6 weeks     Confirm INR is within goal in the past 6 weeks.     Recent Labs   Lab Test 05/12/16   INR 1.5                       Failed - Medication is active on med list        Passed - Recent (12 mo) or future (30 days) visit within the authorizing provider's specialty     Patient has had an office visit with the authorizing provider or a provider within the authorizing providers department within the previous 12 mos or has a future within next 30 days. See \"Patient Info\" tab in inbasket, or \"Choose Columns\" in Meds & Orders section of the refill encounter.              Passed - Patient is 18 years of age or older        Passed - Patient is not pregnant        Passed - No positive pregnancy on file in past 12 months        spironolactone (ALDACTONE) 25 MG tablet [Pharmacy Med Name: Spironolactone Oral Tablet 25 MG]  Last Written Prescription Date:  1/22/2019  Last Fill Quantity: 90 tablet,  # refills: 3   Last office visit: 8/28/2019 with prescribing provider:  Oracio   Future Office Visit:   Next 5 appointments (look out 90 days)    Feb 04, 2020  8:30 AM CST  PHYSICAL with Rodney Narayanan, " "MD  [facility] ([facility]) [address] [id number] [phone]          90 tablet 2     Sig: TAKE 1 TABLET BY MOUTH ONCE DAILY       Diuretics (Including Combos) Protocol Passed - 1/28/2020  2:40 PM        Passed - Blood pressure under 140/90 in past 12 months     BP Readings from Last 3 Encounters:   08/28/19 124/70   07/02/19 132/70   06/11/19 130/70                 Passed - Recent (12 mo) or future (30 days) visit within the authorizing provider's specialty     Patient has had an office visit with the authorizing provider or a provider within the authorizing providers department within the previous 12 mos or has a future within next 30 days. See \"Patient Info\" tab in inbasket, or \"Choose Columns\" in Meds & Orders section of the refill encounter.              Passed - Medication is active on med list        Passed - Patient is age 18 or older        Passed - No active pregancy on record        Passed - Normal serum creatinine on file in past 12 months     Recent Labs   Lab Test 08/08/19  1408   CR 1.00              Passed - Normal serum potassium on file in past 12 months     Recent Labs   Lab Test 08/08/19  1408   POTASSIUM 3.6                    Passed - Normal serum sodium on file in past 12 months     Recent Labs   Lab Test 08/08/19  1408                 Passed - No positive pregnancy test in past 12 months           "

## 2020-01-29 RX ORDER — SPIRONOLACTONE 25 MG/1
TABLET ORAL
Qty: 90 TABLET | Refills: 3 | Status: SHIPPED | OUTPATIENT
Start: 2020-01-29 | End: 2020-02-04

## 2020-01-29 RX ORDER — WARFARIN SODIUM 5 MG/1
TABLET ORAL
Qty: 90 TABLET | Refills: 4 | Status: SHIPPED | OUTPATIENT
Start: 2020-01-29 | End: 2020-01-30

## 2020-01-29 NOTE — TELEPHONE ENCOUNTER
spironolactone (ALDACTONE) 25 MG tablet   Routing refill request to provider for review/approval because:  Drug interaction warning/allergy    Routed to provider    Warfarin  Routed to INR pool- is pt on this medication?

## 2020-01-30 ENCOUNTER — TELEPHONE (OUTPATIENT)
Dept: FAMILY MEDICINE | Facility: CLINIC | Age: 80
End: 2020-01-30

## 2020-01-30 RX ORDER — WARFARIN SODIUM 5 MG/1
TABLET ORAL
COMMUNITY
Start: 2020-01-30 | End: 2020-02-04

## 2020-01-30 NOTE — TELEPHONE ENCOUNTER
Spoke with Cardiology managing INR, they will transmit new RX with current [facility] directions.     RX cancelled in chart, re-entered as historical/patient taking for completeness of record.  Refills should be handled by cardiology.    Corry Yen, PharmD BCACP  Anticoagulation Clinical Pharmacist

## 2020-01-30 NOTE — TELEPHONE ENCOUNTER
Reason for Call:  Other prescription    Detailed comments: [facility] pharmacy called.  Needs clarification on the  Warfarin     Phone Number Patient can be reached at: Home number on file [phone] (home)    Best Time: any    Can we leave a detailed message on this number? YES    Call taken on 1/30/2020 at 2:21 PM by KAREN BRIGGS

## 2020-02-03 DIAGNOSIS — I10 HYPERTENSION GOAL BP (BLOOD PRESSURE) < 140/90: ICD-10-CM

## 2020-02-03 DIAGNOSIS — K21.9 GASTROESOPHAGEAL REFLUX DISEASE WITHOUT ESOPHAGITIS: ICD-10-CM

## 2020-02-03 NOTE — TELEPHONE ENCOUNTER
"Requested Prescriptions   Pending Prescriptions Disp Refills     bumetanide (BUMEX) 1 MG tablet [Pharmacy Med Name: Bumetanide Oral Tablet 1 MG]  Last Written Prescription Date:  1/22/2019  Last Fill Quantity: 180 tablet,  # refills: 3   Last office visit: 8/28/2019 with prescribing provider:  Oracio   Future Office Visit:   Next 5 appointments (look out 90 days)    Feb 04, 2020  8:30 AM CST  PHYSICAL with Rodney Narayanan MD  [facility] ([facility]) [address] [place] [id number]  [phone]          180 tablet 2     Sig: TAKE 1 TABLET BY MOUTH TWICE DAILY       Diuretics (Including Combos) Protocol Passed - 2/3/2020  6:58 AM        Passed - Blood pressure under 140/90 in past 12 months     BP Readings from Last 3 Encounters:   08/28/19 124/70   07/02/19 132/70   06/11/19 130/70                 Passed - Recent (12 mo) or future (30 days) visit within the authorizing provider's specialty     Patient has had an office visit with the authorizing provider or a provider within the authorizing providers department within the previous 12 mos or has a future within next 30 days. See \"Patient Info\" tab in inbasket, or \"Choose Columns\" in Meds & Orders section of the refill encounter.              Passed - Medication is active on med list        Passed - Patient is age 18 or older        Passed - No active pregancy on record        Passed - Normal serum creatinine on file in past 12 months     Recent Labs   Lab Test 08/08/19  1408   CR 1.00              Passed - Normal serum potassium on file in past 12 months     Recent Labs   Lab Test 08/08/19  1408   POTASSIUM 3.6                    Passed - Normal serum sodium on file in past 12 months     Recent Labs   Lab Test 08/08/19  1408                 Passed - No positive pregnancy test in past 12 months        omeprazole (PRILOSEC) 20 MG DR capsule [Pharmacy Med Name: Omeprazole " "Oral Capsule Delayed Release 20 MG]  Last Written Prescription Date:  1/22/2019  Last Fill Quantity: 90 capsule,  # refills: 3   Last office visit: 8/28/2019 with prescribing provider:  Oracio   Future Office Visit:   Next 5 appointments (look out 90 days)    Feb 04, 2020  8:30 AM CST  PHYSICAL with Rodney Narayanan MD  [facility] ([facility]) [address] [place] [id number] [phone]          90 capsule 2     Sig: TAKE 1 CAPSULE BY MOUTH ONCE DAILY       PPI Protocol Passed - 2/3/2020  6:58 AM        Passed - Not on Clopidogrel (unless Pantoprazole ordered)        Passed - No diagnosis of osteoporosis on record        Passed - Recent (12 mo) or future (30 days) visit within the authorizing provider's specialty     Patient has had an office visit with the authorizing provider or a provider within the authorizing providers department within the previous 12 mos or has a future within next 30 days. See \"Patient Info\" tab in inbasket, or \"Choose Columns\" in Meds & Orders section of the refill encounter.              Passed - Medication is active on med list        Passed - Patient is age 18 or older        Passed - No active pregnacy on record        Passed - No positive pregnancy test in past 12 months           "

## 2020-02-04 ENCOUNTER — OFFICE VISIT (OUTPATIENT)
Dept: FAMILY MEDICINE | Facility: CLINIC | Age: 80
End: 2020-02-04
Payer: COMMERCIAL

## 2020-02-04 VITALS
BODY MASS INDEX: 31.53 KG/M2 | SYSTOLIC BLOOD PRESSURE: 130 MMHG | DIASTOLIC BLOOD PRESSURE: 72 MMHG | WEIGHT: 167 LBS | OXYGEN SATURATION: 97 % | HEART RATE: 84 BPM | HEIGHT: 61 IN | RESPIRATION RATE: 20 BRPM

## 2020-02-04 DIAGNOSIS — E03.9 HYPOTHYROIDISM, UNSPECIFIED TYPE: ICD-10-CM

## 2020-02-04 DIAGNOSIS — I10 ESSENTIAL HYPERTENSION WITH GOAL BLOOD PRESSURE LESS THAN 130/80: ICD-10-CM

## 2020-02-04 DIAGNOSIS — K21.9 GASTROESOPHAGEAL REFLUX DISEASE WITHOUT ESOPHAGITIS: ICD-10-CM

## 2020-02-04 DIAGNOSIS — G47.00 INSOMNIA, UNSPECIFIED TYPE: ICD-10-CM

## 2020-02-04 DIAGNOSIS — Z00.00 ROUTINE GENERAL MEDICAL EXAMINATION AT A HEALTH CARE FACILITY: Primary | ICD-10-CM

## 2020-02-04 DIAGNOSIS — M79.7 FIBROMYALGIA: ICD-10-CM

## 2020-02-04 DIAGNOSIS — G89.4 CHRONIC PAIN SYNDROME: ICD-10-CM

## 2020-02-04 DIAGNOSIS — E78.5 HYPERLIPIDEMIA WITH TARGET LDL LESS THAN 100: ICD-10-CM

## 2020-02-04 DIAGNOSIS — I48.0 PAF (PAROXYSMAL ATRIAL FIBRILLATION) (H): ICD-10-CM

## 2020-02-04 DIAGNOSIS — D64.9 ANEMIA, UNSPECIFIED TYPE: ICD-10-CM

## 2020-02-04 DIAGNOSIS — I87.2 CHRONIC VENOUS INSUFFICIENCY: ICD-10-CM

## 2020-02-04 DIAGNOSIS — R73.01 IMPAIRED FASTING GLUCOSE: ICD-10-CM

## 2020-02-04 DIAGNOSIS — E87.6 HYPOKALEMIA: ICD-10-CM

## 2020-02-04 DIAGNOSIS — F41.1 GENERALIZED ANXIETY DISORDER: ICD-10-CM

## 2020-02-04 DIAGNOSIS — Z23 NEED FOR PROPHYLACTIC VACCINATION AND INOCULATION AGAINST INFLUENZA: ICD-10-CM

## 2020-02-04 LAB
BASOPHILS # BLD AUTO: 0 10E9/L (ref 0–0.2)
BASOPHILS NFR BLD AUTO: 0.6 %
DIFFERENTIAL METHOD BLD: NORMAL
EOSINOPHIL # BLD AUTO: 0 10E9/L (ref 0–0.7)
EOSINOPHIL NFR BLD AUTO: 0 %
ERYTHROCYTE [DISTWIDTH] IN BLOOD BY AUTOMATED COUNT: 13.6 % (ref 10–15)
HBA1C MFR BLD: 5.7 % (ref 0–5.6)
HCT VFR BLD AUTO: 40.2 % (ref 35–47)
HGB BLD-MCNC: 12.7 G/DL (ref 11.7–15.7)
LYMPHOCYTES # BLD AUTO: 1.6 10E9/L (ref 0.8–5.3)
LYMPHOCYTES NFR BLD AUTO: 23.8 %
MCH RBC QN AUTO: 28.8 PG (ref 26.5–33)
MCHC RBC AUTO-ENTMCNC: 31.6 G/DL (ref 31.5–36.5)
MCV RBC AUTO: 91 FL (ref 78–100)
MONOCYTES # BLD AUTO: 1.2 10E9/L (ref 0–1.3)
MONOCYTES NFR BLD AUTO: 17.7 %
NEUTROPHILS # BLD AUTO: 3.8 10E9/L (ref 1.6–8.3)
NEUTROPHILS NFR BLD AUTO: 57.9 %
PLATELET # BLD AUTO: 220 10E9/L (ref 150–450)
RBC # BLD AUTO: 4.41 10E12/L (ref 3.8–5.2)
WBC # BLD AUTO: 6.6 10E9/L (ref 4–11)

## 2020-02-04 PROCEDURE — 80053 COMPREHEN METABOLIC PANEL: CPT | Performed by: INTERNAL MEDICINE

## 2020-02-04 PROCEDURE — 83036 HEMOGLOBIN GLYCOSYLATED A1C: CPT | Performed by: INTERNAL MEDICINE

## 2020-02-04 PROCEDURE — 99213 OFFICE O/P EST LOW 20 MIN: CPT | Mod: 25 | Performed by: INTERNAL MEDICINE

## 2020-02-04 PROCEDURE — 84443 ASSAY THYROID STIM HORMONE: CPT | Performed by: INTERNAL MEDICINE

## 2020-02-04 PROCEDURE — G0008 ADMIN INFLUENZA VIRUS VAC: HCPCS | Performed by: INTERNAL MEDICINE

## 2020-02-04 PROCEDURE — 90662 IIV NO PRSV INCREASED AG IM: CPT | Performed by: INTERNAL MEDICINE

## 2020-02-04 PROCEDURE — 85025 COMPLETE CBC W/AUTO DIFF WBC: CPT | Performed by: INTERNAL MEDICINE

## 2020-02-04 PROCEDURE — 99397 PER PM REEVAL EST PAT 65+ YR: CPT | Mod: 25 | Performed by: INTERNAL MEDICINE

## 2020-02-04 PROCEDURE — 36415 COLL VENOUS BLD VENIPUNCTURE: CPT | Performed by: INTERNAL MEDICINE

## 2020-02-04 RX ORDER — BUSPIRONE HYDROCHLORIDE 10 MG/1
10 TABLET ORAL 2 TIMES DAILY
Qty: 180 TABLET | Refills: 3 | Status: SHIPPED | OUTPATIENT
Start: 2020-02-04 | End: 2021-02-08

## 2020-02-04 RX ORDER — BUMETANIDE 1 MG/1
TABLET ORAL
Qty: 180 TABLET | Refills: 2
Start: 2020-02-04

## 2020-02-04 RX ORDER — LEVOTHYROXINE SODIUM 137 UG/1
137 TABLET ORAL DAILY
Qty: 90 TABLET | Refills: 3 | Status: SHIPPED | OUTPATIENT
Start: 2020-02-04 | End: 2021-02-08

## 2020-02-04 RX ORDER — METOPROLOL TARTRATE 50 MG
50 TABLET ORAL 2 TIMES DAILY
Qty: 180 TABLET | Refills: 3 | Status: SHIPPED | OUTPATIENT
Start: 2020-02-04 | End: 2021-02-08

## 2020-02-04 RX ORDER — ATORVASTATIN CALCIUM 40 MG/1
40 TABLET, FILM COATED ORAL DAILY
Qty: 90 TABLET | Refills: 3 | Status: SHIPPED | OUTPATIENT
Start: 2020-02-04 | End: 2021-02-08

## 2020-02-04 RX ORDER — POTASSIUM CHLORIDE 750 MG/1
10 TABLET, EXTENDED RELEASE ORAL DAILY
Qty: 90 TABLET | Refills: 3 | Status: SHIPPED | OUTPATIENT
Start: 2020-02-04 | End: 2021-02-08

## 2020-02-04 RX ORDER — TRAMADOL HYDROCHLORIDE 50 MG/1
TABLET ORAL
Qty: 270 TABLET | Refills: 2 | Status: SHIPPED | OUTPATIENT
Start: 2020-02-04 | End: 2020-08-06

## 2020-02-04 RX ORDER — WARFARIN SODIUM 5 MG/1
TABLET ORAL
Qty: 90 TABLET | Refills: 4 | Status: SHIPPED | OUTPATIENT
Start: 2020-02-04 | End: 2021-02-08

## 2020-02-04 RX ORDER — BUMETANIDE 1 MG/1
1 TABLET ORAL 2 TIMES DAILY
Qty: 180 TABLET | Refills: 3 | Status: SHIPPED | OUTPATIENT
Start: 2020-02-04 | End: 2021-02-08

## 2020-02-04 RX ORDER — TRAZODONE HYDROCHLORIDE 50 MG/1
50 TABLET, FILM COATED ORAL AT BEDTIME
Qty: 90 TABLET | Refills: 3 | Status: SHIPPED | OUTPATIENT
Start: 2020-02-04 | End: 2021-02-08

## 2020-02-04 RX ORDER — SPIRONOLACTONE 25 MG/1
25 TABLET ORAL DAILY
Qty: 90 TABLET | Refills: 3 | Status: SHIPPED | OUTPATIENT
Start: 2020-02-04 | End: 2021-02-08

## 2020-02-04 ASSESSMENT — MIFFLIN-ST. JEOR: SCORE: 1164.89

## 2020-02-04 ASSESSMENT — ACTIVITIES OF DAILY LIVING (ADL): CURRENT_FUNCTION: NO ASSISTANCE NEEDED

## 2020-02-04 NOTE — PROGRESS NOTES
"SUBJECTIVE:   Jennifer Torres is a 79 year old female who presents for Preventive Visit.      Are you in the first 12 months of your Medicare coverage?  No    Healthy Habits:     In general, how would you rate your overall health?  Good    Frequency of exercise:  None    Do you usually eat at least 4 servings of fruit and vegetables a day, include whole grains    & fiber and avoid regularly eating high fat or \"junk\" foods?  No    Taking medications regularly:  Yes    Medication side effects:  Not applicable    Ability to successfully perform activities of daily living:  No assistance needed    Home Safety:  No safety concerns identified    Hearing Impairment:  No hearing concerns    In the past 6 months, have you been bothered by leaking of urine? Yes    In general, how would you rate your overall mental or emotional health?  Excellent      PHQ-2 Total Score: 0    Additional concerns today:  No    Do you feel safe in your environment? Yes    Have you ever done Advance Care Planning? (For example, a Health Directive, POLST, or a discussion with a medical provider or your loved ones about your wishes): Yes, patient states has an Advance Care Planning document and will bring a copy to the clinic.      Fall risk     No falls with last year  Cognitive Screening   1) Repeat 3 items (Leader, Season, Table)    2) Clock draw: NORMAL  3) 3 item recall: Recalls 3 objects  Results: 3 items recalled: COGNITIVE IMPAIRMENT LESS LIKELY    Mini-CogTM Copyright ANGELIQUE Ramirez. Licensed by the author for use in [facility]; reprinted with permission (iglesia@.[facility]). All rights reserved.      Do you have sleep apnea, excessive snoring or daytime drowsiness?: no    Reviewed and updated as needed this visit by clinical staff  Tobacco  Allergies  Meds  Med Hx  Surg Hx  Fam Hx  Soc Hx        Reviewed and updated as needed this visit by Provider        Social History     Tobacco Use     Smoking status: Never Smoker     " "Smokeless tobacco: Never Used   Substance Use Topics     Alcohol use: Yes     Comment: occ.     If you drink alcohol do you typically have >3 drinks per day or >7 drinks per week? No    Alcohol Use 2/4/2020   Prescreen: >3 drinks/day or >7 drinks/week? Not Applicable   Prescreen: >3 drinks/day or >7 drinks/week? -           Has been working with Cardiology.    EF is lower.                                  Requests a tramadol refill; still \"very helpful\" in terms of dealing with various painful conditions.             Has decided for now on no further FIDEL.         Taking trazodone at hs.                                                  Current providers sharing in care for this patient include:   Patient Care Team:  Rodney Narayanan MD as PCP - General (Internal Medicine)  Rodney Narayanan MD as Assigned PCP  Cheryl Estrada, RN as  (Family Medicine - Geriatric Medicine)    The following health maintenance items are reviewed in Epic and correct as of today:  Health Maintenance   Topic Date Due     URINE DRUG SCREEN  1940     ADVANCE CARE PLANNING  1940     ZOSTER IMMUNIZATION (1 of 2) 08/16/1989     INFLUENZA VACCINE (1) 09/01/2019     PHQ-2  01/01/2020     FALL RISK ASSESSMENT  01/22/2020     MEDICARE ANNUAL WELLNESS VISIT  01/22/2020     LIPID  01/22/2024     DTAP/TDAP/TD IMMUNIZATION (4 - Td) 12/29/2024     DEXA  Completed     PNEUMOCOCCAL IMMUNIZATION 65+ LOW/MEDIUM RISK  Completed     IPV IMMUNIZATION  Aged Out     MENINGITIS IMMUNIZATION  Aged Out     Patient Active Problem List    Diagnosis Date Noted     PAF (paroxysmal atrial fibrillation) (H) 09/02/2016     Priority: High     Ablation and amiodarone and warfarin in 5/16; bradycardia in 8/16; pacemaker placed.              amio stopped in 10/16.     Warfarin stopped by Cardiology in 10/16, and restarted by them in 7/17         Chronic pain syndrome 01/13/2016     Priority: High      checked 12/06/2019 no concerns- last fill on " 19 for 270 tablets (90 day supply)  No controlled substance agreement on file.       Grief reaction 2015     Priority: High     Son  age 53       Rib fractures 2015     Priority: High     Essential hypertension with goal blood pressure less than 130/80 2013     Priority: High     SOB (shortness of breath) 2013     Priority: High     See cardiology letter  and ; PFT's nml except some air trapping; pt reports MDI did not help       Lumbar spinal stenosis 2013     Priority: High     Impaired fasting glucose      Priority: High     , A1C 7.8 in ; add metformin.  , A1C down to 6.1 ; 115 and 6.3 in ; 82 in ;     110 and 6.3 in ; 107 and 6.0 in  , 98 and 5.9 in        Coronary atherosclerosis      Priority: High     LAD stent ;              See cardiology note 3/13; pt has ? Anginal sx; also severe DILLON; in ,  cor angio showed multiple 50% lesions                   See Cardiology notes; decreased EF; 40-45%     Cor angio no lesions; imdur added; sx improved             Insomnia, unspecified type 2019     Priority: Medium     Hypokalemia 2019     Priority: Medium     Abnormal weight gain 2019     Priority: Medium     Gastroesophageal reflux disease without esophagitis 2019     Priority: Medium     Anemia, unspecified type 10/17/2018     Priority: Medium     NSAID long-term use 10/17/2018     Priority: Medium     Erythrocytosis 2018     Priority: Medium     hgb 17.3 in        Hypothyroidism, unspecified type 2018     Priority: Medium     MENDEL (obstructive sleep apnea) 2018     Priority: Medium     Dx 2017?; is using CPAP       Hyperlipidemia with target LDL less than 100 2013     Priority: Medium     Diagnosis updated by automated process. Provider to review and confirm.       Generalized anxiety disorder 2013     Priority: Medium     Diagnosis updated by automated process.  "Provider to review and confirm.       Chronic low back pain 07/09/2013     Priority: Medium     PHQ-9 = 10 in 7/13       Spinal fusion failure (H) 07/09/2013     Priority: Medium     Iron deficiency anemia 04/02/2013     Priority: Medium     Took iron for 2 months late 2012; and in 2013.           In 4/13, ferritin 12; in 7/13, up to 25 with Fe;  In 12/13, ferritin only 27 but Hgb up to 13.7; FIT neg in 1/14. Not taking Fe in 12/14;  56 and 14;         43 and 13 in 1/16        25 and 13.2 in 1/17; resume Fe QOD. Not taking Fe in 1/18; 58 and 17.3 in 1/18   (hgb 17.3 is an error);         13.1 and 46 in 1/19;  Add a MVI with Fe       Fibromyalgia      Priority: Medium     Sees Dr. Valverde; he retired 2012       Chronic venous insufficiency      Priority: Medium     Preventive measure 04/02/2013     Priority: Low     [facility] DATA BASE UNDER THE 12/4/12 NOTE  Colonoscopy 6/05  FIT neg in 1/14        Colonoscopy neg in 2/16        Mammogram 1/16, 1/18 ,1/19                Bone density \"normal\"  2009; ERT [id number]           Health Care Home 10/03/2012     Priority: Low     Cheryl Estrada BS, RN, PHN  [facility]    [phone]      DX V65.8 REPLACED WITH [id number] (04/08/2013)         Past Surgical History:   Procedure Laterality Date     APPENDECTOMY  1956     BACK SURGERY  9/12    lumbar spine fusion; Dr. Marvel BECKHAM TOTAL KNEE ARTHROPLASTY Bilateral 2004,2005     CARPAL TUNNEL RELEASE RT/LT Bilateral 1974     CHOLECYSTECTOMY  2001     HYSTERECTOMY  1977    and L oophorectomy     Social History     Socioeconomic History     Marital status:      Spouse name: Not on file     Number of children: 4     Years of education: Not on file     Highest education level: Not on file   Occupational History     Not on file   Social Needs     Financial resource strain: Not on file     Food insecurity:     Worry: Not on file     Inability: Not on file     Transportation needs:     Medical: Not on file "     Non-medical: Not on file   Tobacco Use     Smoking status: Never Smoker     Smokeless tobacco: Never Used   Substance and Sexual Activity     Alcohol use: Yes     Comment: occ.     Drug use: No     Sexual activity: Not Currently   Lifestyle     Physical activity:     Days per week: Not on file     Minutes per session: Not on file     Stress: Not on file   Relationships     Social connections:     Talks on phone: Not on file     Gets together: Not on file     Attends Taoist service: Not on file     Active member of club or organization: Not on file     Attends meetings of clubs or organizations: Not on file     Relationship status: Not on file     Intimate partner violence:     Fear of current or ex partner: Not on file     Emotionally abused: Not on file     Physically abused: Not on file     Forced sexual activity: Not on file   Other Topics Concern     Parent/sibling w/ CABG, MI or angioplasty before 65F 55M? Yes   Social History Narrative     Not on file     Immunization History   Administered Date(s) Administered     Influenza (High Dose) 3 valent vaccine 12/24/2013, 12/29/2014, 01/06/2016, 01/10/2017, 01/17/2018, 10/17/2018     Influenza (IIV3) PF 11/14/2011, 12/04/2012     Pneumo Conj 13-V (2010&after) 01/10/2017     Pneumococcal 23 valent 10/28/2009     TD (ADULT, 7+) 12/24/2004, 09/14/2006     TDAP Vaccine (Adacel) 12/29/2014       BP Readings from Last 3 Encounters:   02/04/20 130/72   08/28/19 124/70   07/02/19 132/70    Wt Readings from Last 3 Encounters:   02/04/20 75.8 kg (167 lb)   08/28/19 79.4 kg (175 lb)   07/02/19 78.5 kg (173 lb)                  Current Outpatient Medications   Medication Sig Dispense Refill     aspirin 81 MG tablet Take 1 tablet by mouth daily       atorvastatin (LIPITOR) 40 MG tablet Take 1 tablet (40 mg) by mouth daily 90 tablet 3     bisacodyl (DULCOLAX) 5 MG EC tablet Take 5-10 mg by mouth as needed        Blood Glucose Monitoring Suppl (BLOOD GLUCOSE SYSTEM YARELY) KIT  Dispense meter, test strips, lancets covered by pt ins. 250.00 NIDDM type II - Test 1 time/day       bumetanide (BUMEX) 1 MG tablet Take 1 tablet (1 mg) by mouth 2 times daily 180 tablet 3     busPIRone (BUSPAR) 10 MG tablet Take 1 tablet (10 mg) by mouth 2 times daily 180 tablet 3     Cholecalciferol (VITAMIN D) 2000 UNITS CAPS Take 2 capsules by mouth daily.       Cyanocobalamin (B-12) 500 MCG TABS  150 tablet      fish oil-omega-3 fatty acids (FISH OIL) 1000 MG capsule Take 1 g by mouth daily.       levothyroxine (SYNTHROID/LEVOTHROID) 137 MCG tablet Take 1 tablet (137 mcg) by mouth daily 90 tablet 3     metoprolol tartrate (LOPRESSOR) 50 MG tablet Take 1 tablet (50 mg) by mouth 2 times daily 180 tablet 3     Multiple Vitamins-Minerals (MULTIVITAMIN OR) Take 1 tablet by mouth daily.       nitroglycerin (NITROSTAT) 0.4 MG SL tablet Place 0.4 mg under the tongue       omeprazole (PRILOSEC) 20 MG DR capsule Take 1 capsule (20 mg) by mouth daily 90 capsule 3     potassium chloride ER (K-TAB/KLOR-CON) 10 MEQ CR tablet Take 1 tablet (10 mEq) by mouth daily 90 tablet 3     spironolactone (ALDACTONE) 25 MG tablet TAKE 1 TABLET BY MOUTH ONCE DAILY 90 tablet 3     traMADol (ULTRAM) 50 MG tablet TAKE 1 TABLET BY MOUTH 3 TIMES DAILY 90 tablet 5     traZODone (DESYREL) 50 MG tablet Take 1 tablet (50 mg) by mouth At Bedtime The dose can be increased to 75 or 100 mg 90 tablet 3     warfarin ANTICOAGULANT (COUMADIN) 5 MG tablet 2.5 mg Tu/Th, 5 mg all other days  Managed by [facility]       No Known Allergies      Review of Systems  CONSTITUTIONAL: NEGATIVE for fever, chills, change in weight  INTEGUMENTARY/SKIN: NEGATIVE for worrisome rashes, moles or lesions  EYES: NEGATIVE for vision changes or irritation  ENT/MOUTH: NEGATIVE for ear, mouth and throat problems  RESP: NEGATIVE for significant cough or SOB  BREAST: NEGATIVE for masses, tenderness or discharge  CV: NEGATIVE for chest pain, palpitations or peripheral edema  GI:  "NEGATIVE for nausea, abdominal pain, heartburn, or change in bowel habits  : NEGATIVE for frequency, dysuria, or hematuria  MUSCULOSKELETAL:POSITIVE  for arthralgias  and myalgia  NEURO: NEGATIVE for weakness, dizziness or paresthesias; \"a little bit\" of memory loss  ENDOCRINE: NEGATIVE for temperature intolerance, skin/hair changes  HEME: NEGATIVE for bleeding problems  PSYCHIATRIC: NEGATIVE for changes in mood or affect    OBJECTIVE:   /72 (BP Location: Right arm, Patient Position: Chair, Cuff Size: Adult Regular)   Pulse 84   Resp 20   Ht 1.549 m (5' 1\")   Wt 75.8 kg (167 lb)   SpO2 97%   BMI 31.55 kg/m   Estimated body mass index is 31.55 kg/m  as calculated from the following:    Height as of this encounter: 1.549 m (5' 1\").    Weight as of this encounter: 75.8 kg (167 lb).  Physical Exam  GENERAL APPEARANCE: alert, no distress, over weight and fatigued  EYES: Eyes grossly normal to inspection  HENT: ear canals and TM's normal, nose and mouth without ulcers or lesions, oropharynx clear and oral mucous membranes moist  NECK: no adenopathy, no asymmetry, masses, or scars and thyroid normal to palpation  RESP: lungs clear to auscultation - no rales, rhonchi or wheezes  BREAST: normal without masses, tenderness or nipple discharge and no palpable axillary masses or adenopathy  CV: regular rates and rhythm (paced), normal S1 S2, no S3 or S4, no murmur, click or rub, no peripheral edema and peripheral pulses diminished  ABDOMEN: soft, nontender, no hepatosplenomegaly and no masses  MS: no musculoskeletal defects are noted, spine/back exam reveals kyphosis  SKIN: no suspicious lesions or rashes  NEURO: Normal strength and tone, mentation intact and speech normal  PSYCH: mentation appears normal and affect normal/bright    Diagnostic Test Results:  pending    ASSESSMENT / PLAN:   Jennifer was seen today for physical and imm/inj.    Diagnoses and all orders for this visit:    Routine general medical " examination at a health care facility    Fibromyalgia  -     traMADol (ULTRAM) 50 MG tablet; TAKE 1 TABLET BY MOUTH 3 TIMES DAILY    Hypothyroidism, unspecified type  -     TSH with free T4 reflex  -     levothyroxine (SYNTHROID/LEVOTHROID) 137 MCG tablet; Take 1 tablet (137 mcg) by mouth daily    Generalized anxiety disorder  -     busPIRone (BUSPAR) 10 MG tablet; Take 1 tablet (10 mg) by mouth 2 times daily    Impaired fasting glucose  -     Comprehensive metabolic panel (BMP + Alb, Alk Phos, ALT, AST, Total. Bili, TP)  -     Hemoglobin A1c    Anemia, unspecified type  -     CBC with platelets and differential    PAF (paroxysmal atrial fibrillation) (H)  -     metoprolol tartrate (LOPRESSOR) 50 MG tablet; Take 1 tablet (50 mg) by mouth 2 times daily  -     warfarin ANTICOAGULANT (COUMADIN) 5 MG tablet; 2.5 mg Tu/Th, 5 mg all other days            Managed by [facility]    Essential hypertension with goal blood pressure less than 130/80  -     Comprehensive metabolic panel (BMP + Alb, Alk Phos, ALT, AST, Total. Bili, TP)  -     bumetanide (BUMEX) 1 MG tablet; Take 1 tablet (1 mg) by mouth 2 times daily  -     potassium chloride ER (K-TAB/KLOR-CON) 10 MEQ CR tablet; Take 1 tablet (10 mEq) by mouth daily    Insomnia, unspecified type  -     traZODone (DESYREL) 50 MG tablet; Take 1 tablet (50 mg) by mouth At Bedtime The dose can be increased to 75 or 100 mg    Chronic pain syndrome  -     traMADol (ULTRAM) 50 MG tablet; TAKE 1 TABLET BY MOUTH 3 TIMES DAILY    Hyperlipidemia with target LDL less than 100  -     atorvastatin (LIPITOR) 40 MG tablet; Take 1 tablet (40 mg) by mouth daily    Gastroesophageal reflux disease without esophagitis  -     omeprazole (PRILOSEC) 20 MG DR capsule; Take 1 capsule (20 mg) by mouth daily    Hypokalemia  -     potassium chloride ER (K-TAB/KLOR-CON) 10 MEQ CR tablet; Take 1 tablet (10 mEq) by mouth daily    Chronic venous insufficiency  -     spironolactone (ALDACTONE) 25 MG tablet; Take  "1 tablet (25 mg) by mouth daily    Need for prophylactic vaccination and inoculation against influenza  -     INFLUENZA (HIGH DOSE) 3 VALENT VACCINE [13765]  -     ADMIN INFLUENZA (For MEDICARE Patients ONLY) []                      Summary and implications:  We reviewed multiple issues.           We reviewed all of the issues on the diagnoses list.         Check labs and adjust medications as indicated.                    No significant memory issues found today.     Ok to continue TID tramadol.         Patient Instructions   I will let you know your lab results.   Consider getting the shingles vaccine (Shingrix) at your pharmacy.         Return in about 6 months (around 8/4/2020) for follow up of several issues.        COUNSELING:  Reviewed preventive health counseling, as reflected in patient instructions  Special attention given to:       Regular exercise       Healthy diet/nutrition    Estimated body mass index is 31.55 kg/m  as calculated from the following:    Height as of this encounter: 1.549 m (5' 1\").    Weight as of this encounter: 75.8 kg (167 lb).    Weight management plan: Discussed healthy diet and exercise guidelines     reports that she has never smoked. She has never used smokeless tobacco.      Appropriate preventive services were discussed with this patient, including applicable screening as appropriate for cardiovascular disease, diabetes, osteopenia/osteoporosis, and glaucoma.  As appropriate for age/gender, discussed screening for colorectal cancer, prostate cancer, breast cancer, and cervical cancer. Checklist reviewing preventive services available has been given to the patient.    Reviewed patients plan of care and provided an AVS. The Basic Care Plan (routine screening as documented in Health Maintenance) for Jennifer meets the Care Plan requirement. This Care Plan has been established and reviewed with the Patient and spouse.    Counseling Resources:  ATP IV Guidelines  Pooled " Cohorts Equation Calculator  Breast Cancer Risk Calculator  FRAX Risk Assessment  ICSI Preventive Guidelines  Dietary Guidelines for Americans, 2010  USDA's MyPlate  ASA Prophylaxis  Lung CA Screening    Rodney Narayanan MD  [facility]    Results for orders placed or performed in visit on 02/04/20   Comprehensive metabolic panel (BMP + Alb, Alk Phos, ALT, AST, Total. Bili, TP)     Status: Abnormal   Result Value Ref Range    Sodium 139 133 - 144 mmol/L    Potassium 3.7 3.4 - 5.3 mmol/L    Chloride 104 94 - 109 mmol/L    Carbon Dioxide 30 20 - 32 mmol/L    Anion Gap 5 3 - 14 mmol/L    Glucose 92 70 - 99 mg/dL    Urea Nitrogen 20 7 - 30 mg/dL    Creatinine 1.06 (H) 0.52 - 1.04 mg/dL    GFR Estimate 49 (L) >60 mL/min/[1.73_m2]    GFR Estimate If Black 57 (L) >60 mL/min/[1.73_m2]    Calcium 8.7 8.5 - 10.1 mg/dL    Bilirubin Total 0.6 0.2 - 1.3 mg/dL    Albumin 3.4 3.4 - 5.0 g/dL    Protein Total 7.1 6.8 - 8.8 g/dL    Alkaline Phosphatase 89 40 - 150 U/L    ALT 36 0 - 50 U/L    AST 48 (H) 0 - 45 U/L   Hemoglobin A1c     Status: Abnormal   Result Value Ref Range    Hemoglobin A1C 5.7 (H) 0 - 5.6 %   TSH with free T4 reflex     Status: None   Result Value Ref Range    TSH 0.77 0.40 - 4.00 mU/L   CBC with platelets and differential     Status: None   Result Value Ref Range    WBC 6.6 4.0 - 11.0 10e9/L    RBC Count 4.41 3.8 - 5.2 10e12/L    Hemoglobin 12.7 11.7 - 15.7 g/dL    Hematocrit 40.2 35.0 - 47.0 %    MCV 91 78 - 100 fl    MCH 28.8 26.5 - 33.0 pg    MCHC 31.6 31.5 - 36.5 g/dL    RDW 13.6 10.0 - 15.0 %    Platelet Count 220 150 - 450 10e9/L    Diff Method Automated Method     % Neutrophils 57.9 %    % Lymphocytes 23.8 %    % Monocytes 17.7 %    % Eosinophils 0.0 %    % Basophils 0.6 %    Absolute Neutrophil 3.8 1.6 - 8.3 10e9/L    Absolute Lymphocytes 1.6 0.8 - 5.3 10e9/L    Absolute Monocytes 1.2 0.0 - 1.3 10e9/L    Absolute Eosinophils 0.0 0.0 - 0.7 10e9/L    Absolute Basophils 0.0 0.0 -  0.2 10e9/L     Letter sent.      The thyroid level is good. Keep taking the same dosage.  Your other lab results are normal or stable,including the liver,kidney,glucose,potassium,and the bone marrow testing.

## 2020-02-04 NOTE — LETTER
February 5, 2020      Jennifer Torres  [address] [facility] [address]        Dear ,    We are writing to inform you of your test results.          The thyroid level is good. Keep taking the same dosage.  Your other lab results are normal or stable,including the liver,kidney,glucose,potassium,and the bone marrow testing.        Results for orders placed or performed in visit on 02/04/20   Comprehensive metabolic panel (BMP + Alb, Alk Phos, ALT, AST, Total. Bili, TP)     Status: Abnormal   Result Value Ref Range    Sodium 139 133 - 144 mmol/L    Potassium 3.7 3.4 - 5.3 mmol/L    Chloride 104 94 - 109 mmol/L    Carbon Dioxide 30 20 - 32 mmol/L    Anion Gap 5 3 - 14 mmol/L    Glucose 92 70 - 99 mg/dL    Urea Nitrogen 20 7 - 30 mg/dL    Creatinine 1.06 (H) 0.52 - 1.04 mg/dL    GFR Estimate 49 (L) >60 mL/min/[1.73_m2]    GFR Estimate If Black 57 (L) >60 mL/min/[1.73_m2]    Calcium 8.7 8.5 - 10.1 mg/dL    Bilirubin Total 0.6 0.2 - 1.3 mg/dL    Albumin 3.4 3.4 - 5.0 g/dL    Protein Total 7.1 6.8 - 8.8 g/dL    Alkaline Phosphatase 89 40 - 150 U/L    ALT 36 0 - 50 U/L    AST 48 (H) 0 - 45 U/L   Hemoglobin A1c     Status: Abnormal   Result Value Ref Range    Hemoglobin A1C 5.7 (H) 0 - 5.6 %   TSH with free T4 reflex     Status: None   Result Value Ref Range    TSH 0.77 0.40 - 4.00 mU/L   CBC with platelets and differential     Status: None   Result Value Ref Range    WBC 6.6 4.0 - 11.0 10e9/L    RBC Count 4.41 3.8 - 5.2 10e12/L    Hemoglobin 12.7 11.7 - 15.7 g/dL    Hematocrit 40.2 35.0 - 47.0 %    MCV 91 78 - 100 fl    MCH 28.8 26.5 - 33.0 pg    MCHC 31.6 31.5 - 36.5 g/dL    RDW 13.6 10.0 - 15.0 %    Platelet Count 220 150 - 450 10e9/L    Diff Method Automated Method     % Neutrophils 57.9 %    % Lymphocytes 23.8 %    % Monocytes 17.7 %    % Eosinophils 0.0 %    % Basophils 0.6 %    Absolute Neutrophil 3.8 1.6 - 8.3 10e9/L    Absolute Lymphocytes 1.6 0.8 - 5.3 10e9/L    Absolute Monocytes 1.2 0.0 -  1.3 10e9/L    Absolute Eosinophils 0.0 0.0 - 0.7 10e9/L    Absolute Basophils 0.0 0.0 - 0.2 10e9/L         If you have any questions or concerns, please call the clinic at the number listed above.       Sincerely,        Rodney Narayanan MD

## 2020-02-05 LAB
ALBUMIN SERPL-MCNC: 3.4 G/DL (ref 3.4–5)
ALP SERPL-CCNC: 89 U/L (ref 40–150)
ALT SERPL W P-5'-P-CCNC: 36 U/L (ref 0–50)
ANION GAP SERPL CALCULATED.3IONS-SCNC: 5 MMOL/L (ref 3–14)
AST SERPL W P-5'-P-CCNC: 48 U/L (ref 0–45)
BILIRUB SERPL-MCNC: 0.6 MG/DL (ref 0.2–1.3)
BUN SERPL-MCNC: 20 MG/DL (ref 7–30)
CALCIUM SERPL-MCNC: 8.7 MG/DL (ref 8.5–10.1)
CHLORIDE SERPL-SCNC: 104 MMOL/L (ref 94–109)
CO2 SERPL-SCNC: 30 MMOL/L (ref 20–32)
CREAT SERPL-MCNC: 1.06 MG/DL (ref 0.52–1.04)
GFR SERPL CREATININE-BSD FRML MDRD: 49 ML/MIN/{1.73_M2}
GLUCOSE SERPL-MCNC: 92 MG/DL (ref 70–99)
POTASSIUM SERPL-SCNC: 3.7 MMOL/L (ref 3.4–5.3)
PROT SERPL-MCNC: 7.1 G/DL (ref 6.8–8.8)
SODIUM SERPL-SCNC: 139 MMOL/L (ref 133–144)
TSH SERPL DL<=0.005 MIU/L-ACNC: 0.77 MU/L (ref 0.4–4)

## 2020-06-24 LAB — EJECTION FRACTION: NORMAL %

## 2020-08-06 DIAGNOSIS — M79.7 FIBROMYALGIA: ICD-10-CM

## 2020-08-06 DIAGNOSIS — G89.4 CHRONIC PAIN SYNDROME: ICD-10-CM

## 2020-08-06 RX ORDER — TRAMADOL HYDROCHLORIDE 50 MG/1
TABLET ORAL
Qty: 270 TABLET | Refills: 0 | Status: SHIPPED | OUTPATIENT
Start: 2020-08-06 | End: 2020-09-02

## 2020-08-06 NOTE — TELEPHONE ENCOUNTER
Controlled Substance Refill Request for traMADol (ULTRAM) 50 MG tablet     Problem List Complete:  Yes    Chronic pain syndrome   1/13/2016    Overview     checked: 8/6/2020, no concerns  No controlled substance agreement on file.        checked in past 3 months?  Yes 08/06/20

## 2020-08-06 NOTE — TELEPHONE ENCOUNTER
Controlled Substance Refill Request for tramadol  Problem List Complete:  Yes     Chronic pain syndrome   1/13/2016    Overview     checked 12/06/2019 no concerns- last fill on 5/6/19 for 270 tablets (90 day supply)  No controlled substance agreement on file.      checked in past 3 months?  Yes 8/6/2020, no concerns.       Routing refill request to provider for review/approval because:  Drug not on the [facility] refill protocol

## 2020-09-02 ENCOUNTER — VIRTUAL VISIT (OUTPATIENT)
Dept: FAMILY MEDICINE | Facility: CLINIC | Age: 80
End: 2020-09-02
Payer: COMMERCIAL

## 2020-09-02 DIAGNOSIS — M79.7 FIBROMYALGIA: Primary | ICD-10-CM

## 2020-09-02 DIAGNOSIS — G89.4 CHRONIC PAIN SYNDROME: ICD-10-CM

## 2020-09-02 PROCEDURE — 99441 ZZC PHYSICIAN TELEPHONE EVALUATION 5-10 MIN: CPT | Mod: 95 | Performed by: INTERNAL MEDICINE

## 2020-09-02 RX ORDER — TRAMADOL HYDROCHLORIDE 50 MG/1
TABLET ORAL
Qty: 90 TABLET | Refills: 5 | Status: SHIPPED | OUTPATIENT
Start: 2020-09-02 | End: 2021-02-08

## 2020-09-02 NOTE — PROGRESS NOTES
"Jennifer Torres is a 80 year old female who is being evaluated via a billable telephone visit.      The patient has been notified of following:     \"This telephone visit will be conducted via a call between you and your physician/provider. We have found that certain health care needs can be provided without the need for a physical exam.  This service lets us provide the care you need with a short phone conversation.  If a prescription is necessary we can send it directly to your pharmacy.  If lab work is needed we can place an order for that and you can then stop by our lab to have the test done at a later time.    Telephone visits are billed at different rates depending on your insurance coverage. During this emergency period, for some insurers they may be billed the same as an in-person visit.  Please reach out to your insurance provider with any questions.    If during the course of the call the physician/provider feels a telephone visit is not appropriate, you will not be charged for this service.\"    Patient has given verbal consent for Telephone visit?  Yes    What phone number would you like to be contacted at? [phone]    How would you like to obtain your AVS? Gabino Her     Jennifer Torres is a 80 year old female who presents via phone visit today for the following health issues:    HPI    Hyperlipidemia Follow-Up      Are you regularly taking any medication or supplement to lower your cholesterol?   Yes- lipitor    Are you having muscle aches or other side effects that you think could be caused by your cholesterol lowering medication?  No    Hypertension Follow-up      Do you check your blood pressure regularly outside of the clinic? Yes     Are you following a low salt diet? Yes    Are your blood pressures ever more than 140 on the top number (systolic) OR more   than 90 on the bottom number (diastolic), for example 140/90? No    Hypothyroidism Follow-up      Since last visit, patient " "describes the following symptoms: Weight stable, no hair loss, no skin changes, no constipation, no loose stools      How many servings of fruits and vegetables do you eat daily?  2-3    On average, how many sweetened beverages do you drink each day (Examples: soda, juice, sweet tea, etc.  Do NOT count diet or artificially sweetened beverages)?   0    How many days per week do you exercise enough to make your heart beat faster? 5    How many minutes a day do you exercise enough to make your heart beat faster? 20 - 29    How many days per week do you miss taking your medication? 0          She walks the dog every day.                              Still takes tramadol; she forgot to take it one morning, and she \"ached all over\".             Only getting a 30 day supply now; wants a refill; takes 50 mg TID.              Review of Systems   CONSTITUTIONAL:NEGATIVE for fever, chills, change in weight  RESP:NEGATIVE for significant cough or SOB  CV: NEGATIVE for chest pain, palpitations        Objective          Vitals:  No vitals were obtained today due to virtual visit.      PSYCH: Alert and oriented times 3; coherent speech, normal   rate and volume, able to articulate logical thoughts, able   to abstract reason, no tangential thoughts, no hallucinations   or delusions  Her affect is normal and pleasant  RESP: No cough, no audible wheezing, able to talk in full sentences  Remainder of exam unable to be completed due to telephone visits            Assessment/Plan:    Assessment & Plan     Jennifer was seen today for lipids, hypertension and thyroid problem.    Diagnoses and all orders for this visit:    Fibromyalgia  -     traMADol (ULTRAM) 50 MG tablet; TAKE 1 TABLET BY MOUTH 3 TIMES DAILY    Chronic pain syndrome  -     traMADol (ULTRAM) 50 MG tablet; TAKE 1 TABLET BY MOUTH 3 TIMES DAILY         BMI:   Estimated body mass index is 31.55 kg/m  as calculated from the following:    Height as of 2/4/20: 1.549 m (5' 1\").   "  Weight as of 2/4/20: 75.8 kg (167 lb).   Weight management plan: Discussed healthy diet and exercise guidelines        Summary and implications: overall, she is very stable.                 Ok for tramadol refill.               See Patient Instructions    Return in about 5 months (around 2/8/2021) for yearly wellness visit, medication review and refills.    Rodney Narayanan MD  [facility]    Phone call duration:  8 minutes

## 2020-11-06 ENCOUNTER — TRANSFERRED RECORDS (OUTPATIENT)
Dept: HEALTH INFORMATION MANAGEMENT | Facility: CLINIC | Age: 80
End: 2020-11-06

## 2020-11-06 LAB
ALT SERPL-CCNC: 40 U/L (ref 0–78)
AST SERPL-CCNC: 47 U/L (ref 0–37)
CREAT SERPL-MCNC: 1.18 MG/DL (ref 0.6–1.02)
GLUCOSE SERPL-MCNC: 104 MG/DL (ref 74–100)
POTASSIUM SERPL-SCNC: 4.4 MMOL/L (ref 3.5–5.1)
TSH SERPL-ACNC: 0.89 UIU/ML (ref 0.45–4.5)

## 2020-11-09 ENCOUNTER — TRANSFERRED RECORDS (OUTPATIENT)
Dept: HEALTH INFORMATION MANAGEMENT | Facility: CLINIC | Age: 80
End: 2020-11-09

## 2021-01-03 NOTE — PROGRESS NOTES
"  SUBJECTIVE:   Jennifer Torres is a 78 year old female who presents to clinic today for the following health issues:      Discuss recent lab results. Also check Left upper leg-pain for couple weeks, no known injury though.                             Here with her support dog.                          hgb 17 to 12 from  to 10/18.    Ferritin 67 in 10/18; was 58.             Does not eat much meat.                   Does not want oral Fe.        Perhaps the 17 hgb was a lab error.                                Creat 1.1 to 1.4.        She cut back spironolactone to 25 mg per day a couple of months ago.                    Takes advil most every day; 600-800 mg per day.           (\"for my back\").     Ongoing back issues.                            still takes a PPI.                                      Problem list and histories reviewed & adjusted, as indicated.      Patient Active Problem List    Diagnosis Date Noted     PAF (paroxysmal atrial fibrillation) (H) 2016     Priority: High     Ablation and amiodarone and warfarin in ; bradycardia in ; pacemaker placed.              amio stopped in 10/16.     Warfarin stopped by Cardiology in 10/16, and restarted by them in          Chronic pain syndrome 2016     Priority: High      checked 18   No controlled substance agreement on file./       Grief reaction 2015     Priority: High     Son  age 53       Rib fractures 2015     Priority: High     Hypertension goal BP (blood pressure) < 140/90 2013     Priority: High     SOB (shortness of breath) 2013     Priority: High     See cardiology letter  and ; PFT's nml except some air trapping; pt reports MDI did not help       Lumbar spinal stenosis 2013     Priority: High     Impaired fasting glucose      Priority: High     , A1C 7.8 in ; add metformin.  , A1C down to 6.1 ; 115 and 6.3 in ; 82 in ;     110 and 6.3 in ; " "107 and 6.0 in 1/17 , 98 and 5.9 in 1/18       Coronary atherosclerosis      Priority: High     LAD stent 2008;              See cardiology note 3/13; pt has ? Anginal sx; also severe DILLON; in 9/13,  cor angio showed multiple 50% lesions           Erythrocytosis 01/18/2018     Priority: Medium     hgb 17.3 in 1/18       Hypothyroidism, unspecified type 01/17/2018     Priority: Medium     MENDEL (obstructive sleep apnea) 01/17/2018     Priority: Medium     Dx 2017?; is using CPAP       Routine general medical examination at a health care facility 01/10/2017     Priority: Medium     Hyperlipidemia with target LDL less than 100 12/24/2013     Priority: Medium     Diagnosis updated by automated process. Provider to review and confirm.       Generalized anxiety disorder 12/24/2013     Priority: Medium     Diagnosis updated by automated process. Provider to review and confirm.       Hypothyroidism 12/24/2013     Priority: Medium     Chronic low back pain 07/09/2013     Priority: Medium     PHQ-9 = 10 in 7/13       Spinal fusion failure (H) 07/09/2013     Priority: Medium     Iron deficiency anemia 04/02/2013     Priority: Medium     Took iron for 2 months late 2012; and in 2013.           In 4/13, ferritin 12; in 7/13, up to 25 with Fe;  In 12/13, ferritin only 27 but Hgb up to 13.7; FIT neg in 1/14. Not taking Fe in 12/14;  56 and 14;         43 and 13 in 1/16        25 and 13.2 in 1/17; resume Fe QOD. Not taking Fe in 1/18; 58 and 17.3 in 1/18       Fibromyalgia      Priority: Medium     Sees Dr. Valverde; he retired 2012       Chronic venous insufficiency      Priority: Medium     Preventive measure 04/02/2013     Priority: Low     APRIMA DATA BASE UNDER THE 12/4/12 NOTE  Colonoscopy 6/05  FIT neg in 1/14        Colonoscopy neg in 2/16        Mammogram 1/16, 1/18                 Bone density \"normal\"  2009; ERT 4203-2996           Health Care Home 10/03/2012     Priority: Low     Cheryl Estrada, BS, RN, PHN  FPA Case " Manager   [phone]      DX V65.8 REPLACED WITH [id number] [facility] (04/08/2013)           Current Outpatient Prescriptions   Medication Sig Dispense Refill     aspirin 81 MG tablet Take 1 tablet by mouth daily       atorvastatin (LIPITOR) 40 MG tablet Take 1 tablet (40 mg) by mouth daily 90 tablet 3     bisacodyl (DULCOLAX) 5 MG EC tablet Take 5-10 mg by mouth       Blood Glucose Monitoring Suppl (BLOOD GLUCOSE SYSTEM YARELY) KIT Dispense meter, test strips, lancets covered by pt ins. 250.00 NIDDM type II - Test 1 time/day       bumetanide (BUMEX) 1 MG tablet Take 1 tablet (1 mg) by mouth 2 times daily 180 tablet 3     busPIRone (BUSPAR) 10 MG tablet Take 1 tablet (10 mg) by mouth 2 times daily 180 tablet 3     Cholecalciferol (VITAMIN D) 2000 UNITS CAPS Take 2 capsules by mouth daily.       Cyanocobalamin (B-12) 500 MCG TABS  150 tablet      fish oil-omega-3 fatty acids (FISH OIL) 1000 MG capsule Take 1 g by mouth daily.       levothyroxine (SYNTHROID/LEVOTHROID) 137 MCG tablet Take 1 tablet (137 mcg) by mouth daily 90 tablet 3     metoprolol tartrate (LOPRESSOR) 50 MG tablet Take 1 tablet (50 mg) by mouth 2 times daily 180 tablet 3     Multiple Vitamins-Minerals (MULTIVITAMIN OR) Take 1 tablet by mouth daily.       nitroglycerin (NITROSTAT) 0.4 MG SL tablet Place 0.4 mg under the tongue       omeprazole (PRILOSEC) 20 MG CR capsule Take 1 capsule (20 mg) by mouth daily 90 capsule 3     potassium chloride (K-TAB,KLOR-CON) 10 MEQ tablet Take 1 tablet (10 mEq) by mouth daily 90 tablet 3     spironolactone (ALDACTONE) 25 MG tablet Take 2 tablets (50 mg) by mouth every morning 180 tablet 3     traMADol (ULTRAM) 50 MG tablet TAKE 1 TABLET BY MOUTH 3 TIMES DAILY 270 tablet 2     warfarin (COUMADIN) 5 MG tablet Take 5 mg by mouth       BP Readings from Last 3 Encounters:   10/17/18 126/64   01/17/18 130/78   01/10/17 128/78    Wt Readings from Last 3 Encounters:   10/17/18 165 lb (74.8 kg)   01/17/18 168 lb (76.2 kg)  "  01/10/17 167 lb (75.8 kg)                    Reviewed and updated as needed this visit by clinical staff       Reviewed and updated as needed this visit by Provider         ROS:  CONSTITUTIONAL:NEGATIVE for fever, chills, change in weight                 GI: Negative for hematochezia or melena  MUSCULOSKELETAL: POSITIVE  for back pain   NEURO: POSITIVE for gait disturbance    OBJECTIVE:                                                    /64 (BP Location: Left arm, Patient Position: Chair, Cuff Size: Adult Large)  Pulse 88  Temp 97.5  F (36.4  C)  Resp 20  Ht 5' 1\" (1.549 m)  Wt 165 lb (74.8 kg)  SpO2 98%  Breastfeeding? No  BMI 31.18 kg/m2  Body mass index is 31.18 kg/(m^2).  GENERAL APPEARANCE: alert and no distress  CV: regular rates and rhythm, normal S1 S2, no S3 or S4 and no murmur, click or rub  NEURO: mentation intact, speech normal and gait abnormal     Diagnostic test results:  Results for orders placed or performed in visit on 10/04/18   Comprehensive metabolic panel (BMP + Alb, Alk Phos, ALT, AST, Total. Bili, TP)   Result Value Ref Range    Sodium 138 133 - 144 mmol/L    Potassium 4.7 3.4 - 5.3 mmol/L    Chloride 101 94 - 109 mmol/L    Carbon Dioxide 31 20 - 32 mmol/L    Anion Gap 6 3 - 14 mmol/L    Glucose 88 70 - 99 mg/dL    Urea Nitrogen 34 (H) 7 - 30 mg/dL    Creatinine 1.41 (H) 0.52 - 1.04 mg/dL    GFR Estimate 36 (L) >60 mL/min/1.7m2    GFR Estimate If Black 44 (L) >60 mL/min/1.7m2    Calcium 8.6 8.5 - 10.1 mg/dL    Bilirubin Total 0.5 0.2 - 1.3 mg/dL    Albumin 3.8 3.4 - 5.0 g/dL    Protein Total 7.5 6.8 - 8.8 g/dL    Alkaline Phosphatase 81 40 - 150 U/L    ALT 32 0 - 50 U/L    AST 45 0 - 45 U/L   CBC with platelets and differential   Result Value Ref Range    WBC 7.5 4.0 - 11.0 10e9/L    RBC Count 4.00 3.8 - 5.2 10e12/L    Hemoglobin 12.3 11.7 - 15.7 g/dL    Hematocrit 37.4 35.0 - 47.0 %    MCV 94 78 - 100 fl    MCH 30.8 26.5 - 33.0 pg    MCHC 32.9 31.5 - 36.5 g/dL    RDW 13.8 " 10.0 - 15.0 %    Platelet Count 235 150 - 450 10e9/L    % Neutrophils 61.1 %    % Lymphocytes 25.1 %    % Monocytes 13.1 %    % Eosinophils 0.0 %    % Basophils 0.7 %    Absolute Neutrophil 4.6 1.6 - 8.3 10e9/L    Absolute Lymphocytes 1.9 0.8 - 5.3 10e9/L    Absolute Monocytes 1.0 0.0 - 1.3 10e9/L    Absolute Eosinophils 0.0 0.0 - 0.7 10e9/L    Absolute Basophils 0.1 0.0 - 0.2 10e9/L    Diff Method Automated Method    Ferritin   Result Value Ref Range    Ferritin 67 8 - 252 ng/mL        ASSESSMENT/PLAN:                                                        ICD-10-CM    1. Anemia, unspecified type D64.9    2. Renal insufficiency N28.9    3. NSAID long-term use Z79.1    4. Chronic venous insufficiency I87.2 spironolactone (ALDACTONE) 25 MG tablet   5. Hypertension goal BP (blood pressure) < 140/90 I10 spironolactone (ALDACTONE) 25 MG tablet   6. Fibromyalgia M79.7 traMADol (ULTRAM) 50 MG tablet   7. Chronic pain syndrome G89.4 traMADol (ULTRAM) 50 MG tablet   8. Need for prophylactic vaccination and inoculation against influenza Z23 FLU VACCINE, INCREASED ANTIGEN, PRESV FREE, AGE 65+ [70998]     ADMIN INFLUENZA (For MEDICARE Patients ONLY) []       Summary and implications:                    I suspect the hemoglobin level of 17+ was erroneous.  This is very inconsistent with her multiple other hemoglobin levels.                 Renal insufficiency relates to her diuretic use and her NSAID use.                             Continue the lower dose of spironolactone, and she can stop taking potassium.  Follow up with Provider -3 months; due for her yearly exam then.  Recheck labs at that time.  Patient Instructions   You should stop taking the potassium tablets.                  You should minimize your ibuprofen use.       Keep taking the spironolactone,25 mg per day.       Rodney Narayanan MD  [facility]    Injectable Influenza Immunization Documentation    1.  Is the person to be  vaccinated sick today?   No    2. Does the person to be vaccinated have an allergy to a component   of the vaccine?   No  Egg Allergy Algorithm Link    3. Has the person to be vaccinated ever had a serious reaction   to influenza vaccine in the past?   No    4. Has the person to be vaccinated ever had Guillain-Barré syndrome?   No    Form completed by Shanna Galdamez LPN          Yes

## 2021-01-15 ENCOUNTER — HEALTH MAINTENANCE LETTER (OUTPATIENT)
Age: 81
End: 2021-01-15

## 2021-01-20 ENCOUNTER — TRANSFERRED RECORDS (OUTPATIENT)
Dept: HEALTH INFORMATION MANAGEMENT | Facility: CLINIC | Age: 81
End: 2021-01-20

## 2021-01-20 DIAGNOSIS — K21.9 GASTROESOPHAGEAL REFLUX DISEASE WITHOUT ESOPHAGITIS: ICD-10-CM

## 2021-01-25 ENCOUNTER — TRANSFERRED RECORDS (OUTPATIENT)
Dept: HEALTH INFORMATION MANAGEMENT | Facility: CLINIC | Age: 81
End: 2021-01-25

## 2021-02-07 NOTE — PATIENT INSTRUCTIONS
I will let you know your lab results.                         Have a good, safe rest of the winter.

## 2021-02-08 ENCOUNTER — OFFICE VISIT (OUTPATIENT)
Dept: INTERNAL MEDICINE | Facility: CLINIC | Age: 81
End: 2021-02-08
Payer: COMMERCIAL

## 2021-02-08 VITALS
BODY MASS INDEX: 33.07 KG/M2 | SYSTOLIC BLOOD PRESSURE: 116 MMHG | WEIGHT: 175 LBS | OXYGEN SATURATION: 100 % | DIASTOLIC BLOOD PRESSURE: 74 MMHG | HEART RATE: 66 BPM

## 2021-02-08 DIAGNOSIS — I25.10 ATHEROSCLEROSIS OF NATIVE CORONARY ARTERY OF NATIVE HEART WITHOUT ANGINA PECTORIS: Chronic | ICD-10-CM

## 2021-02-08 DIAGNOSIS — I87.2 CHRONIC VENOUS INSUFFICIENCY: ICD-10-CM

## 2021-02-08 DIAGNOSIS — D64.9 ANEMIA, UNSPECIFIED TYPE: ICD-10-CM

## 2021-02-08 DIAGNOSIS — E03.9 HYPOTHYROIDISM, UNSPECIFIED TYPE: ICD-10-CM

## 2021-02-08 DIAGNOSIS — G47.00 INSOMNIA, UNSPECIFIED TYPE: ICD-10-CM

## 2021-02-08 DIAGNOSIS — E87.6 HYPOKALEMIA: ICD-10-CM

## 2021-02-08 DIAGNOSIS — E78.5 HYPERLIPIDEMIA WITH TARGET LDL LESS THAN 100: ICD-10-CM

## 2021-02-08 DIAGNOSIS — K21.9 GASTROESOPHAGEAL REFLUX DISEASE WITHOUT ESOPHAGITIS: ICD-10-CM

## 2021-02-08 DIAGNOSIS — Z00.00 ENCOUNTER FOR ANNUAL WELLNESS EXAM IN MEDICARE PATIENT: Primary | ICD-10-CM

## 2021-02-08 DIAGNOSIS — G89.4 CHRONIC PAIN SYNDROME: Chronic | ICD-10-CM

## 2021-02-08 DIAGNOSIS — I10 ESSENTIAL HYPERTENSION WITH GOAL BLOOD PRESSURE LESS THAN 130/80: Chronic | ICD-10-CM

## 2021-02-08 DIAGNOSIS — F41.1 GENERALIZED ANXIETY DISORDER: ICD-10-CM

## 2021-02-08 DIAGNOSIS — I48.0 PAF (PAROXYSMAL ATRIAL FIBRILLATION) (H): Chronic | ICD-10-CM

## 2021-02-08 DIAGNOSIS — R73.01 IMPAIRED FASTING GLUCOSE: ICD-10-CM

## 2021-02-08 DIAGNOSIS — M79.7 FIBROMYALGIA: Chronic | ICD-10-CM

## 2021-02-08 LAB
BASOPHILS # BLD AUTO: 0.1 10E9/L (ref 0–0.2)
BASOPHILS NFR BLD AUTO: 0.7 %
CHOLEST SERPL-MCNC: 173 MG/DL
DIFFERENTIAL METHOD BLD: NORMAL
EOSINOPHIL # BLD AUTO: 0 10E9/L (ref 0–0.7)
EOSINOPHIL NFR BLD AUTO: 0 %
ERYTHROCYTE [DISTWIDTH] IN BLOOD BY AUTOMATED COUNT: 13.9 % (ref 10–15)
FERRITIN SERPL-MCNC: 63 NG/ML (ref 8–252)
HBA1C MFR BLD: 6 % (ref 0–5.6)
HCT VFR BLD AUTO: 40.5 % (ref 35–47)
HDLC SERPL-MCNC: 54 MG/DL
HGB BLD-MCNC: 12.8 G/DL (ref 11.7–15.7)
LDLC SERPL CALC-MCNC: 77 MG/DL
LYMPHOCYTES # BLD AUTO: 2.1 10E9/L (ref 0.8–5.3)
LYMPHOCYTES NFR BLD AUTO: 30.8 %
MCH RBC QN AUTO: 29.8 PG (ref 26.5–33)
MCHC RBC AUTO-ENTMCNC: 31.6 G/DL (ref 31.5–36.5)
MCV RBC AUTO: 94 FL (ref 78–100)
MONOCYTES # BLD AUTO: 0.9 10E9/L (ref 0–1.3)
MONOCYTES NFR BLD AUTO: 12.5 %
NEUTROPHILS # BLD AUTO: 3.9 10E9/L (ref 1.6–8.3)
NEUTROPHILS NFR BLD AUTO: 56 %
NONHDLC SERPL-MCNC: 119 MG/DL
PLATELET # BLD AUTO: 216 10E9/L (ref 150–450)
RBC # BLD AUTO: 4.3 10E12/L (ref 3.8–5.2)
T4 FREE SERPL-MCNC: 1.04 NG/DL (ref 0.76–1.46)
TRIGL SERPL-MCNC: 212 MG/DL
TSH SERPL DL<=0.005 MIU/L-ACNC: 8.71 MU/L (ref 0.4–4)
WBC # BLD AUTO: 7 10E9/L (ref 4–11)

## 2021-02-08 PROCEDURE — 80061 LIPID PANEL: CPT | Performed by: INTERNAL MEDICINE

## 2021-02-08 PROCEDURE — 85025 COMPLETE CBC W/AUTO DIFF WBC: CPT | Performed by: INTERNAL MEDICINE

## 2021-02-08 PROCEDURE — 83036 HEMOGLOBIN GLYCOSYLATED A1C: CPT | Performed by: INTERNAL MEDICINE

## 2021-02-08 PROCEDURE — 99397 PER PM REEVAL EST PAT 65+ YR: CPT | Performed by: INTERNAL MEDICINE

## 2021-02-08 PROCEDURE — 84439 ASSAY OF FREE THYROXINE: CPT | Performed by: INTERNAL MEDICINE

## 2021-02-08 PROCEDURE — 84443 ASSAY THYROID STIM HORMONE: CPT | Performed by: INTERNAL MEDICINE

## 2021-02-08 PROCEDURE — 80053 COMPREHEN METABOLIC PANEL: CPT | Performed by: INTERNAL MEDICINE

## 2021-02-08 PROCEDURE — 36415 COLL VENOUS BLD VENIPUNCTURE: CPT | Performed by: INTERNAL MEDICINE

## 2021-02-08 PROCEDURE — 82728 ASSAY OF FERRITIN: CPT | Performed by: INTERNAL MEDICINE

## 2021-02-08 PROCEDURE — 99213 OFFICE O/P EST LOW 20 MIN: CPT | Mod: 25 | Performed by: INTERNAL MEDICINE

## 2021-02-08 RX ORDER — BUSPIRONE HYDROCHLORIDE 10 MG/1
10 TABLET ORAL 2 TIMES DAILY
Qty: 180 TABLET | Refills: 3 | Status: SHIPPED | OUTPATIENT
Start: 2021-02-08 | End: 2022-02-24

## 2021-02-08 RX ORDER — SPIRONOLACTONE 25 MG/1
25 TABLET ORAL DAILY
Qty: 90 TABLET | Refills: 3 | Status: SHIPPED | OUTPATIENT
Start: 2021-02-08 | End: 2022-02-24

## 2021-02-08 RX ORDER — METOPROLOL TARTRATE 50 MG
50 TABLET ORAL 2 TIMES DAILY
Qty: 180 TABLET | Refills: 3 | Status: SHIPPED | OUTPATIENT
Start: 2021-02-08 | End: 2022-02-24

## 2021-02-08 RX ORDER — POTASSIUM CHLORIDE 750 MG/1
10 TABLET, EXTENDED RELEASE ORAL DAILY
Qty: 90 TABLET | Refills: 3 | Status: SHIPPED | OUTPATIENT
Start: 2021-02-08 | End: 2022-02-24

## 2021-02-08 RX ORDER — LEVOTHYROXINE SODIUM 137 UG/1
137 TABLET ORAL DAILY
Qty: 90 TABLET | Refills: 3 | Status: SHIPPED | OUTPATIENT
Start: 2021-02-08 | End: 2022-02-24

## 2021-02-08 RX ORDER — BUMETANIDE 1 MG/1
1 TABLET ORAL 2 TIMES DAILY
Qty: 180 TABLET | Refills: 3 | Status: SHIPPED | OUTPATIENT
Start: 2021-02-08 | End: 2022-02-24

## 2021-02-08 RX ORDER — ATORVASTATIN CALCIUM 40 MG/1
40 TABLET, FILM COATED ORAL DAILY
Qty: 90 TABLET | Refills: 3 | Status: SHIPPED | OUTPATIENT
Start: 2021-02-08 | End: 2022-02-24

## 2021-02-08 RX ORDER — TRAMADOL HYDROCHLORIDE 50 MG/1
TABLET ORAL
Qty: 90 TABLET | Refills: 5 | Status: SHIPPED | OUTPATIENT
Start: 2021-02-08 | End: 2021-08-05

## 2021-02-08 RX ORDER — TRAZODONE HYDROCHLORIDE 50 MG/1
50 TABLET, FILM COATED ORAL AT BEDTIME
Qty: 90 TABLET | Refills: 3 | Status: SHIPPED | OUTPATIENT
Start: 2021-02-08

## 2021-02-08 RX ORDER — WARFARIN SODIUM 5 MG/1
TABLET ORAL
Qty: 90 TABLET | Refills: 4 | Status: SHIPPED | OUTPATIENT
Start: 2021-02-08

## 2021-02-08 ASSESSMENT — ACTIVITIES OF DAILY LIVING (ADL): CURRENT_FUNCTION: NO ASSISTANCE NEEDED

## 2021-02-08 NOTE — LETTER
February 9, 2021      Jennifer RAMON Brian  [address] [facility] [address]        Dear ,    We are writing to inform you of your test results.    {results letter list:[id number]}    Resulted Orders   Lipid Profile (Chol, Trig, HDL, LDL calc)   Result Value Ref Range    Cholesterol 173 <200 mg/dL    Triglycerides 212 (H) <150 mg/dL      Comment:      Borderline high:  150-199 mg/dl  High:             200-499 mg/dl  Very high:       >499 mg/dl      HDL Cholesterol 54 >49 mg/dL    LDL Cholesterol Calculated 77 <100 mg/dL      Comment:      Desirable:       <100 mg/dl    Non HDL Cholesterol 119 <130 mg/dL   Ferritin   Result Value Ref Range    Ferritin 63 8 - 252 ng/mL   CBC with platelets and differential   Result Value Ref Range    WBC 7.0 4.0 - 11.0 10e9/L    RBC Count 4.30 3.8 - 5.2 10e12/L    Hemoglobin 12.8 11.7 - 15.7 g/dL    Hematocrit 40.5 35.0 - 47.0 %    MCV 94 78 - 100 fl    MCH 29.8 26.5 - 33.0 pg    MCHC 31.6 31.5 - 36.5 g/dL    RDW 13.9 10.0 - 15.0 %    Platelet Count 216 150 - 450 10e9/L    % Neutrophils 56.0 %    % Lymphocytes 30.8 %    % Monocytes 12.5 %    % Eosinophils 0.0 %    % Basophils 0.7 %    Absolute Neutrophil 3.9 1.6 - 8.3 10e9/L    Absolute Lymphocytes 2.1 0.8 - 5.3 10e9/L    Absolute Monocytes 0.9 0.0 - 1.3 10e9/L    Absolute Eosinophils 0.0 0.0 - 0.7 10e9/L    Absolute Basophils 0.1 0.0 - 0.2 10e9/L    Diff Method Automated Method    TSH with free T4 reflex   Result Value Ref Range    TSH 8.71 (H) 0.40 - 4.00 mU/L   Hemoglobin A1c   Result Value Ref Range    Hemoglobin A1C 6.0 (H) 0 - 5.6 %      Comment:      Normal <5.7% Prediabetes 5.7-6.4%  Diabetes 6.5% or higher - adopted from ADA   consensus guidelines.     T4 free   Result Value Ref Range    T4 Free 1.04 0.76 - 1.46 ng/dL   Comprehensive metabolic panel (BMP + Alb, Alk Phos, ALT, AST, Total. Bili, TP)   Result Value Ref Range    Sodium 138 133 - 144 mmol/L    Potassium 4.1 3.4 - 5.3 mmol/L    Chloride 105 94 - 109  mmol/L    Carbon Dioxide 27 20 - 32 mmol/L    Anion Gap 6 3 - 14 mmol/L    Glucose 96 70 - 99 mg/dL    Urea Nitrogen 28 7 - 30 mg/dL    Creatinine 1.17 (H) 0.52 - 1.04 mg/dL    GFR Estimate 44 (L) >60 mL/min/[1.73_m2]      Comment:      Non  GFR Calc  Starting 12/18/2018, serum creatinine based estimated GFR (eGFR) will be   calculated using the Chronic Kidney Disease Epidemiology Collaboration   (CKD-EPI) equation.      GFR Estimate If Black 50 (L) >60 mL/min/[1.73_m2]      Comment:       GFR Calc  Starting 12/18/2018, serum creatinine based estimated GFR (eGFR) will be   calculated using the Chronic Kidney Disease Epidemiology Collaboration   (CKD-EPI) equation.      Calcium 9.3 8.5 - 10.1 mg/dL    Bilirubin Total 0.6 0.2 - 1.3 mg/dL    Albumin 3.7 3.4 - 5.0 g/dL    Protein Total 7.6 6.8 - 8.8 g/dL    Alkaline Phosphatase 86 40 - 150 U/L    ALT 35 0 - 50 U/L    AST 46 (H) 0 - 45 U/L       If you have any questions or concerns, please call the clinic at the number listed above.       Sincerely,      Rodney Narayanan MD

## 2021-02-08 NOTE — LETTER
February 9, 2021      Jennifer RAMON Brian  [address] [place] [place] [facility] [address]        Dear ,    We are writing to inform you of your test results.                 These labs indicate that you may have missed an occasional dose of your thyroid medication, or else may be you are taking it on an empty stomach.                    The 137 mcg dose should be adequate.                                                                                                   Your blood sugar control is stable.       The A1C of 6.0 correlates to an average blood sugar of approximately 120.          Please keep working on restricting carbohydrates ( starches, sweets, etc).                                                                                                                                                  Your other lab results are normal or stable,including the liver,kidney,potassium,cholesterol,iron,and the bone marrow function.        Results for orders placed or performed in visit on 02/08/21   Lipid Profile (Chol, Trig, HDL, LDL calc)     Status: Abnormal   Result Value Ref Range    Cholesterol 173 <200 mg/dL    Triglycerides 212 (H) <150 mg/dL    HDL Cholesterol 54 >49 mg/dL    LDL Cholesterol Calculated 77 <100 mg/dL    Non HDL Cholesterol 119 <130 mg/dL   Ferritin     Status: None   Result Value Ref Range    Ferritin 63 8 - 252 ng/mL   CBC with platelets and differential     Status: None   Result Value Ref Range    WBC 7.0 4.0 - 11.0 10e9/L    RBC Count 4.30 3.8 - 5.2 10e12/L    Hemoglobin 12.8 11.7 - 15.7 g/dL    Hematocrit 40.5 35.0 - 47.0 %    MCV 94 78 - 100 fl    MCH 29.8 26.5 - 33.0 pg    MCHC 31.6 31.5 - 36.5 g/dL    RDW 13.9 10.0 - 15.0 %    Platelet Count 216 150 - 450 10e9/L    % Neutrophils 56.0 %    % Lymphocytes 30.8 %    % Monocytes 12.5 %    % Eosinophils 0.0 %    % Basophils 0.7 %    Absolute Neutrophil 3.9 1.6 - 8.3 10e9/L    Absolute Lymphocytes 2.1 0.8 - 5.3 10e9/L    Absolute  Monocytes 0.9 0.0 - 1.3 10e9/L    Absolute Eosinophils 0.0 0.0 - 0.7 10e9/L    Absolute Basophils 0.1 0.0 - 0.2 10e9/L    Diff Method Automated Method    TSH with free T4 reflex     Status: Abnormal   Result Value Ref Range    TSH 8.71 (H) 0.40 - 4.00 mU/L   Hemoglobin A1c     Status: Abnormal   Result Value Ref Range    Hemoglobin A1C 6.0 (H) 0 - 5.6 %   T4 free     Status: None   Result Value Ref Range    T4 Free 1.04 0.76 - 1.46 ng/dL   Comprehensive metabolic panel (BMP + Alb, Alk Phos, ALT, AST, Total. Bili, TP)     Status: Abnormal   Result Value Ref Range    Sodium 138 133 - 144 mmol/L    Potassium 4.1 3.4 - 5.3 mmol/L    Chloride 105 94 - 109 mmol/L    Carbon Dioxide 27 20 - 32 mmol/L    Anion Gap 6 3 - 14 mmol/L    Glucose 96 70 - 99 mg/dL    Urea Nitrogen 28 7 - 30 mg/dL    Creatinine 1.17 (H) 0.52 - 1.04 mg/dL    GFR Estimate 44 (L) >60 mL/min/[1.73_m2]    GFR Estimate If Black 50 (L) >60 mL/min/[1.73_m2]    Calcium 9.3 8.5 - 10.1 mg/dL    Bilirubin Total 0.6 0.2 - 1.3 mg/dL    Albumin 3.7 3.4 - 5.0 g/dL    Protein Total 7.6 6.8 - 8.8 g/dL    Alkaline Phosphatase 86 40 - 150 U/L    ALT 35 0 - 50 U/L    AST 46 (H) 0 - 45 U/L         If you have any questions or concerns, please call the clinic at the number listed above.       Sincerely,      Rodney Narayanan MD

## 2021-02-09 LAB
ALBUMIN SERPL-MCNC: 3.7 G/DL (ref 3.4–5)
ALP SERPL-CCNC: 86 U/L (ref 40–150)
ALT SERPL W P-5'-P-CCNC: 35 U/L (ref 0–50)
ANION GAP SERPL CALCULATED.3IONS-SCNC: 6 MMOL/L (ref 3–14)
AST SERPL W P-5'-P-CCNC: 46 U/L (ref 0–45)
BILIRUB SERPL-MCNC: 0.6 MG/DL (ref 0.2–1.3)
BUN SERPL-MCNC: 28 MG/DL (ref 7–30)
CALCIUM SERPL-MCNC: 9.3 MG/DL (ref 8.5–10.1)
CHLORIDE SERPL-SCNC: 105 MMOL/L (ref 94–109)
CO2 SERPL-SCNC: 27 MMOL/L (ref 20–32)
CREAT SERPL-MCNC: 1.17 MG/DL (ref 0.52–1.04)
GFR SERPL CREATININE-BSD FRML MDRD: 44 ML/MIN/{1.73_M2}
GLUCOSE SERPL-MCNC: 96 MG/DL (ref 70–99)
POTASSIUM SERPL-SCNC: 4.1 MMOL/L (ref 3.4–5.3)
PROT SERPL-MCNC: 7.6 G/DL (ref 6.8–8.8)
SODIUM SERPL-SCNC: 138 MMOL/L (ref 133–144)

## 2021-06-04 ENCOUNTER — TELEPHONE (OUTPATIENT)
Dept: INTERNAL MEDICINE | Facility: CLINIC | Age: 81
End: 2021-06-04

## 2021-06-04 NOTE — TELEPHONE ENCOUNTER
Pt called     Has been on tramadol >20 years    Previously declined to try another med was always afraid and med was working previously     In constant pain now and ok and agreeable to switching to a different med     Pain starts in legs and goes to rest of body, has had this pain long term     Scheduled for phone visit     Can't take Aleve due to taking blood thinner     Discussed ice/heat - pain is so all over     Discussed if needing to be seen sooner, [facility]     Priscilla RUSSELL RN

## 2021-06-08 NOTE — PATIENT INSTRUCTIONS
You are planning to work with a pain clinic regarding your ongoing chronic pain issues.                        FYI: new [facility] date of 12/31/21.       This date is definite assuming no unusual circumstance.

## 2021-06-09 ENCOUNTER — VIRTUAL VISIT (OUTPATIENT)
Dept: INTERNAL MEDICINE | Facility: CLINIC | Age: 81
End: 2021-06-09
Payer: COMMERCIAL

## 2021-06-09 DIAGNOSIS — M79.605 BILATERAL LEG PAIN: ICD-10-CM

## 2021-06-09 DIAGNOSIS — G89.4 CHRONIC PAIN SYNDROME: Primary | Chronic | ICD-10-CM

## 2021-06-09 DIAGNOSIS — M79.7 FIBROMYALGIA: Chronic | ICD-10-CM

## 2021-06-09 DIAGNOSIS — M79.604 BILATERAL LEG PAIN: ICD-10-CM

## 2021-06-09 PROCEDURE — 99441 PR PHYSICIAN TELEPHONE EVALUATION 5-10 MIN: CPT | Mod: 95 | Performed by: INTERNAL MEDICINE

## 2021-06-09 NOTE — PROGRESS NOTES
Jennifer is a 80 year old who is being evaluated via a billable telephone visit.      What phone number would you like to be contacted at? [phone]  How would you like to obtain your AVS? Gabino    Assessment & Plan     Chronic pain syndrome     We discussed her situation at length.  Etiology of her current pain issues is not entirely clear.  She has multiple factors involved, including lumbar spine disease, and fibromyalgia to name 2 of her pain generating issues.              In view of my upcoming senior living, and that she lives in the [facility], I urged her to establish care with a pain clinic.  She knows of 1 in her area, and she does not recall the name of it, but she plans to look into this further and find out if she needs a referral.  She gets a lot of her care already through the [facility] system.           Bilateral leg pain  Etiology uncertain.    Fibromyalgia  Chronic diagnosis.               Patient Instructions         You are planning to work with a pain clinic regarding your ongoing chronic pain issues.                        FYI: new senior living date of 12/31/21.       This date is definite assuming no unusual circumstance.         No follow-ups on file.    Rodney Narayanan MD  [facility]    Arden Rodriguez is a 80 year old who presents for the following health issues     HPI     Chronic Pain Follow-Up    Where in your body do you have pain? Bilateral legs   How has your pain affected your ability to work? Not applicable  Which of these pain treatments have you tried since your last clinic visit? None   How well are you sleeping? Fair  How has your mood been since your last visit? Slightly worse  Have you had a significant life event? No  Other aggravating factors: prolonged standing and walking   Taking medication as directed? Yes- patient states she has tried increasing tramadol to 4 tablet and noticed no improvement     PHQ-9 SCORE 7/9/2013 7/2/2019  "8/28/2019   PHQ-9 Total Score 10 - -   PHQ-9 Total Score MyChart - 5 (Mild depression) 6 (Mild depression)   PHQ-9 Total Score - 5 6     NOEMÍ-7 SCORE 7/2/2019   Total Score 0 (minimal anxiety)   Total Score 0     No flowsheet data found.  Encounter-Level CSA:    There are no encounter-level csa.     Patient-Level CSA:    There are no patient-level csa.         How many servings of fruits and vegetables do you eat daily?  2-3    On average, how many sweetened beverages do you drink each day (Examples: soda, juice, sweet tea, etc.  Do NOT count diet or artificially sweetened beverages)?   1    How many days per week do you exercise enough to make your heart beat faster? 3 or less    How many minutes a day do you exercise enough to make your heart beat faster? 9 or less    How many days per week do you miss taking your medication? 0    Tramadol not helping,even at 200 mg per day.                       No energy; \" I don't want to do anything\"' cannot do her own housework; mainly due to bilateral leg pain and leg weakness.              Aleve helps; but she has been told not to take NSAID's.                     PAD vincenzo showed nml JAYDON's.     She has chronic lumbar spine issues and has had surgery and injection therapy.                                          Review of Systems         Objective           Vitals:  No vitals were obtained today due to virtual visit.    Physical Exam     PSYCH: Alert and oriented times 3; coherent speech, normal   rate and volume, able to articulate logical thoughts, able   to abstract reason, no tangential thoughts, no hallucinations   or delusions  Her affect is full  RESP: No cough, no audible wheezing, able to talk in full sentences  Remainder of exam unable to be completed due to telephone visits                Phone call duration: 10 minutes  "

## 2021-08-05 DIAGNOSIS — M79.7 FIBROMYALGIA: Chronic | ICD-10-CM

## 2021-08-05 DIAGNOSIS — G89.4 CHRONIC PAIN SYNDROME: Chronic | ICD-10-CM

## 2021-08-05 RX ORDER — TRAMADOL HYDROCHLORIDE 50 MG/1
TABLET ORAL
Qty: 90 TABLET | Refills: 0 | Status: SHIPPED | OUTPATIENT
Start: 2021-08-05

## 2021-08-05 NOTE — TELEPHONE ENCOUNTER
Dr. Narayanan,     Patient pain clinic appointment was 6 weeks ago, and needed an MRI before the provider would prescribe any medications. MRI done on August 3rd. Patient has f/u appointment on August 11th.     Patient is out of Tramadol, patient has been out of tramadol since yesterday and she was hoping you would refill the medication until she is seen on the 11th.     Pended script for review.     Can we leave a detailed message on this number? YES  Phone number patient can be reached at: Home number on file [phone] (home)    Evelyn Roth RN  [facility] Triage

## 2021-08-10 ENCOUNTER — TRANSFERRED RECORDS (OUTPATIENT)
Dept: HEALTH INFORMATION MANAGEMENT | Facility: CLINIC | Age: 81
End: 2021-08-10

## 2021-08-17 LAB — EJECTION FRACTION: 70 %

## 2021-08-23 ENCOUNTER — TRANSFERRED RECORDS (OUTPATIENT)
Dept: HEALTH INFORMATION MANAGEMENT | Facility: CLINIC | Age: 81
End: 2021-08-23

## 2021-10-01 ENCOUNTER — TRANSFERRED RECORDS (OUTPATIENT)
Dept: HEALTH INFORMATION MANAGEMENT | Facility: CLINIC | Age: 81
End: 2021-10-01
Payer: COMMERCIAL

## 2021-10-01 LAB — RETINOPATHY: NORMAL

## 2021-10-12 ENCOUNTER — TELEPHONE (OUTPATIENT)
Dept: FAMILY MEDICINE | Facility: CLINIC | Age: 81
End: 2021-10-12

## 2021-10-12 NOTE — TELEPHONE ENCOUNTER
This is not my patient and should be rescheduled with someone who has seen her in the past or her primary

## 2021-10-25 ENCOUNTER — TRANSFERRED RECORDS (OUTPATIENT)
Dept: HEALTH INFORMATION MANAGEMENT | Facility: CLINIC | Age: 81
End: 2021-10-25
Payer: COMMERCIAL

## 2021-10-28 ENCOUNTER — OFFICE VISIT (OUTPATIENT)
Dept: FAMILY MEDICINE | Facility: CLINIC | Age: 81
End: 2021-10-28
Payer: COMMERCIAL

## 2021-10-28 VITALS
DIASTOLIC BLOOD PRESSURE: 72 MMHG | HEART RATE: 75 BPM | OXYGEN SATURATION: 97 % | HEIGHT: 61 IN | TEMPERATURE: 97.5 F | BODY MASS INDEX: 32.66 KG/M2 | SYSTOLIC BLOOD PRESSURE: 130 MMHG | WEIGHT: 173 LBS

## 2021-10-28 DIAGNOSIS — G47.33 OSA (OBSTRUCTIVE SLEEP APNEA): ICD-10-CM

## 2021-10-28 DIAGNOSIS — I48.0 PAF (PAROXYSMAL ATRIAL FIBRILLATION) (H): Chronic | ICD-10-CM

## 2021-10-28 DIAGNOSIS — Z01.818 PREOP GENERAL PHYSICAL EXAM: Primary | ICD-10-CM

## 2021-10-28 DIAGNOSIS — I10 ESSENTIAL HYPERTENSION WITH GOAL BLOOD PRESSURE LESS THAN 130/80: Chronic | ICD-10-CM

## 2021-10-28 DIAGNOSIS — G89.4 CHRONIC PAIN SYNDROME: Chronic | ICD-10-CM

## 2021-10-28 DIAGNOSIS — E11.9 TYPE 2 DIABETES MELLITUS WITHOUT COMPLICATION, WITHOUT LONG-TERM CURRENT USE OF INSULIN (H): ICD-10-CM

## 2021-10-28 DIAGNOSIS — N18.31 STAGE 3A CHRONIC KIDNEY DISEASE (H): ICD-10-CM

## 2021-10-28 DIAGNOSIS — I25.10 ATHEROSCLEROSIS OF NATIVE CORONARY ARTERY OF NATIVE HEART WITHOUT ANGINA PECTORIS: Chronic | ICD-10-CM

## 2021-10-28 DIAGNOSIS — I50.30 HEART FAILURE WITH PRESERVED EJECTION FRACTION, NYHA CLASS I (H): ICD-10-CM

## 2021-10-28 PROBLEM — E66.9 OBESITY: Status: ACTIVE | Noted: 2021-10-28

## 2021-10-28 PROBLEM — Z79.01 LONG TERM CURRENT USE OF ANTICOAGULANT THERAPY: Status: ACTIVE | Noted: 2018-02-07

## 2021-10-28 PROBLEM — N18.30 CHRONIC KIDNEY DISEASE, STAGE 3 (H): Status: ACTIVE | Noted: 2021-10-28

## 2021-10-28 LAB
ANION GAP SERPL CALCULATED.3IONS-SCNC: 5 MMOL/L (ref 3–14)
BUN SERPL-MCNC: 45 MG/DL (ref 7–30)
CALCIUM SERPL-MCNC: 9 MG/DL (ref 8.5–10.1)
CHLORIDE BLD-SCNC: 105 MMOL/L (ref 94–109)
CO2 SERPL-SCNC: 28 MMOL/L (ref 20–32)
CREAT SERPL-MCNC: 1.45 MG/DL (ref 0.52–1.04)
GFR SERPL CREATININE-BSD FRML MDRD: 34 ML/MIN/1.73M2
GLUCOSE BLD-MCNC: 101 MG/DL (ref 70–99)
HBA1C MFR BLD: 6 % (ref 0–5.6)
POTASSIUM BLD-SCNC: 3.9 MMOL/L (ref 3.4–5.3)
SODIUM SERPL-SCNC: 138 MMOL/L (ref 133–144)

## 2021-10-28 PROCEDURE — 36415 COLL VENOUS BLD VENIPUNCTURE: CPT | Performed by: NURSE PRACTITIONER

## 2021-10-28 PROCEDURE — 93000 ELECTROCARDIOGRAM COMPLETE: CPT | Performed by: NURSE PRACTITIONER

## 2021-10-28 PROCEDURE — 99214 OFFICE O/P EST MOD 30 MIN: CPT | Performed by: NURSE PRACTITIONER

## 2021-10-28 PROCEDURE — 83036 HEMOGLOBIN GLYCOSYLATED A1C: CPT | Performed by: NURSE PRACTITIONER

## 2021-10-28 PROCEDURE — 80048 BASIC METABOLIC PNL TOTAL CA: CPT | Performed by: NURSE PRACTITIONER

## 2021-10-28 RX ORDER — ISOSORBIDE MONONITRATE 30 MG/1
30 TABLET, EXTENDED RELEASE ORAL
COMMUNITY
Start: 2021-08-10

## 2021-10-28 RX ORDER — ACETAMINOPHEN, DEXTROMETHORPHAN HYDROBROMIDE, DOXYLAMINE SUCCINATE, PHENYLEPHRINE HYDROCHLORIDE 650; 20; 12.5; 1 MG/30ML; MG/30ML; MG/30ML; MG/30ML
SOLUTION ORAL
COMMUNITY
Start: 2020-11-06

## 2021-10-28 ASSESSMENT — MIFFLIN-ST. JEOR: SCORE: 1182.1

## 2021-10-28 NOTE — LETTER
October 28, 2021      Jennifer RAMON Brian  [address] [place] [address]        Dear ,    We are writing to inform you of your test results.  Your preoperative labs are stable.     Resulted Orders   Basic metabolic panel  (Ca, Cl, CO2, Creat, Gluc, K, Na, BUN)   Result Value Ref Range    Sodium 138 133 - 144 mmol/L    Potassium 3.9 3.4 - 5.3 mmol/L    Chloride 105 94 - 109 mmol/L    Carbon Dioxide (CO2) 28 20 - 32 mmol/L    Anion Gap 5 3 - 14 mmol/L    Urea Nitrogen 45 (H) 7 - 30 mg/dL    Creatinine 1.45 (H) 0.52 - 1.04 mg/dL    Calcium 9.0 8.5 - 10.1 mg/dL    Glucose 101 (H) 70 - 99 mg/dL    GFR Estimate 34 (L) >60 mL/min/1.73m2      Comment:      As of July 11, 2021, eGFR is calculated by the CKD-EPI creatinine equation, without race adjustment. eGFR can be influenced by muscle mass, exercise, and diet. The reported eGFR is an estimation only and is only applicable if the renal function is stable.   Hemoglobin A1c   Result Value Ref Range    Hemoglobin A1C 6.0 (H) 0.0 - 5.6 %      Comment:      Normal <5.7%   Prediabetes 5.7-6.4%    Diabetes 6.5% or higher     Note: Adopted from ADA consensus guidelines.   If you have any questions or concerns, please call the clinic at the number listed above.     Sincerely,    Sherine Reardon, CNP

## 2021-10-28 NOTE — PROGRESS NOTES
[facility]  [address] [place] [address]  Phone: [phone]  Primary Provider: Rodney Narayanan  Pre-op Performing Provider: ALEXANDER HER    PREOPERATIVE EVALUATION:  Today's date: 10/28/2021    Jennifer Torres is a 81 year old female who presents for a preoperative evaluation.    Surgical Information:  Surgery/Procedure: Spinal Stimulator Implant  Surgery Location: [facility]  Surgeon: Stuart Bowles  Surgery Date: 11/3/2021  Time of Surgery: TBD  Where patient plans to recover: At home with family  Fax number for surgical facility: [phone]    Type of Anesthesia Anticipated: to be determined    Assessment & Plan     The proposed surgical procedure is considered INTERMEDIATE risk.      Preop general physical exam  Cleared for surgery.   - EKG 12-lead complete w/read - Clinics  - Basic metabolic panel  (Ca, Cl, CO2, Creat, Gluc, K, Na, BUN)  - Hemoglobin A1c    Chronic pain syndrome    Essential hypertension with goal blood pressure less than 130/80  Chronic, stable.   - Basic metabolic panel  (Ca, Cl, CO2, Creat, Gluc, K, Na, BUN)    PAF (paroxysmal atrial fibrillation) (H)  Chronic, stable.   Was seen by cardiology on 10/25/21, notes reviewed and they are aware of surgery.   Holding warfarin per cardiology.      Atherosclerosis of native coronary artery of native heart without angina pectoris  Chronic, stable.     Heart failure with preserved ejection fraction, NYHA class I (H)  Chronic, stable.   - Basic metabolic panel  (Ca, Cl, CO2, Creat, Gluc, K, Na, BUN)    Type 2 diabetes mellitus without complication, without long-term current use of insulin (H)  Chronic, stable.   - Basic metabolic panel  (Ca, Cl, CO2, Creat, Gluc, K, Na, BUN)  - Hemoglobin A1c    MENDEL (obstructive sleep apnea)  Chronic, stable.     Stage 3a chronic kidney disease (H)  Chronic, stable.   - Basic metabolic panel  (Ca, Cl, CO2, Creat, Gluc, K, Na, BUN)       Implanted  Device:  Patient has a cardiac pacemaker          Medication Instructions:   - warfarin: hold per cardiology    - Beta Blockers: Continue taking on the day of surgery.   - Diuretics: May continue due to heart failure.   - Statins: Continue taking on the day of surgery.     RECOMMENDATION:  APPROVAL GIVEN to proceed with proposed procedure, without further diagnostic evaluation.      Subjective     HPI related to upcoming procedure: Jennifer Torres is a 82 yo female with a PMH significant for CAD, PAF on warfarin, s/p PPM, HFpEF, diabetes controlled with diet, CKD stage 3, MENDEL on CPAP and chronic back pain who presents for preoperative physical.  She reports she will be having a spine stimulator implanted to help with her back pains.       Preop Questions 10/28/2021   1. Have you ever had a heart attack or stroke? No   2. Have you ever had surgery on your heart or blood vessels, such as a stent placement, a coronary artery bypass, or surgery on an artery in your head, neck, heart, or legs? YES - pacemaker and cardiac stents    3. Do you have chest pain with activity? No   4. Do you have a history of  heart failure? No   5. Do you currently have a cold, bronchitis or symptoms of other infection? No   6. Do you have a cough, shortness of breath, or wheezing? No   7. Do you or anyone in your family have previous history of blood clots? No   8. Do you or does anyone in your family have a serious bleeding problem such as prolonged bleeding following surgeries or cuts? No   9. Have you ever had problems with anemia or been told to take iron pills? No   10. Have you had any abnormal blood loss such as black, tarry or bloody stools, or abnormal vaginal bleeding? No   11. Have you ever had a blood transfusion? YES - remote history   11a. Have you ever had a transfusion reaction? No   12. Are you willing to have a blood transfusion if it is medically needed before, during, or after your surgery? Yes   13. Have you or any of  your relatives ever had problems with anesthesia? No   14. Do you have sleep apnea, excessive snoring or daytime drowsiness? YES - MENDEL    14a. Do you have a CPAP machine? Yes   15. Do you have any artifical heart valves or other implanted medical devices like a pacemaker, defibrillator, or continuous glucose monitor? YES - bilateral knee joints, hardware in her back, cardiac pacemaker   15a. What type of device do you have? Pacemaker   15b. Name of the clinic that manages your device:  [facility]   16. Do you have artificial joints? YES - bilateral knees   17. Are you allergic to latex? No   18. Is there any chance that you may be pregnant? -       Health Care Directive:  Patient does not have a Health Care Directive or Living Will: Patient states has Advance Directive and will bring in a copy to clinic.    Preoperative Review of :   reviewed - controlled substances reflected in medication list.      Status of Chronic Conditions:  See problem list for active medical problems.  Problems all longstanding and stable, except as noted/documented.  See ROS for pertinent symptoms related to these conditions.      Review of Systems  CONSTITUTIONAL: NEGATIVE for fever, chills, change in weight  INTEGUMENTARY/SKIN: NEGATIVE for worrisome rashes, moles or lesions  EYES: NEGATIVE for vision changes or irritation  ENT/MOUTH: NEGATIVE for ear, mouth and throat problems  RESP: NEGATIVE for significant cough or SOB  CV: NEGATIVE for chest pain, palpitations or peripheral edema  GI: NEGATIVE for nausea, abdominal pain, heartburn, or change in bowel habits  : NEGATIVE for frequency, dysuria, or hematuria  MUSCULOSKELETAL: NEGATIVE for significant arthralgias or myalgia  NEURO: NEGATIVE for weakness, dizziness or paresthesias  ENDOCRINE: NEGATIVE for temperature intolerance, skin/hair changes  HEME: NEGATIVE for bleeding problems  PSYCHIATRIC: NEGATIVE for changes in mood or affect    Patient Active Problem List    Diagnosis Date  Noted     Chronic pain syndrome 01/13/2016     Priority: High      checked: 8/6/2020, no concerns  No controlled substance agreement on file.       Hyperlipidemia with target LDL less than 100 12/24/2013     Priority: High     Diagnosis updated by automated process. Provider to review and confirm.       SOB (shortness of breath) 07/09/2013     Priority: High     See cardiology letter 6/13 and 9/13; PFT's nml except some air trapping; pt reports MDI did not help                  Echo EF 55-60 in 6/20       Lumbar spinal stenosis 04/02/2013     Priority: High     Impaired fasting glucose      Priority: High     , A1C 7.8 in 4/13; add metformin.  7/13, A1C down to 6.1 ; 115 and 6.3 in 12/13; 82 in 12/14;     110 and 6.3 in 1/16; 107 and 6.0 in 1/17 , 98 and 5.9 in 1/18       Coronary atherosclerosis      Priority: High     LAD stent 2008;              See cardiology note 3/13; pt has ? Anginal sx; also severe DILLON; in 9/13,  cor angio showed multiple 50% lesions                   See Cardiology notes; decreased EF; 40-45%     Cor angio no lesions; imdur added; sx improved.               Echo EF 55-60 in 6/20             Obesity 10/28/2021     Priority: Medium     Heart failure with preserved ejection fraction, NYHA class I (H) 10/28/2021     Priority: Medium     Chronic kidney disease, stage 3 10/28/2021     Priority: Medium     Insomnia, unspecified type 08/28/2019     Priority: Medium     Hypokalemia 08/28/2019     Priority: Medium     Abnormal weight gain 06/11/2019     Priority: Medium     Gastroesophageal reflux disease without esophagitis 01/22/2019     Priority: Medium     Anemia, unspecified type 10/17/2018     Priority: Medium     NSAID long-term use 10/17/2018     Priority: Medium     Long term current use of anticoagulant therapy 02/07/2018     Priority: Medium     Erythrocytosis 01/18/2018     Priority: Medium     hgb 17.3 in 1/18       Hypothyroidism, unspecified type 01/17/2018     Priority:  Medium     MENDEL (obstructive sleep apnea) 2018     Priority: Medium     Dx 2017?; is using CPAP       PAF (paroxysmal atrial fibrillation) (H) 2016     Priority: Medium     Ablation and amiodarone and warfarin in ; bradycardia in ; pacemaker placed.              amio stopped in 10/16.     Warfarin stopped by Cardiology in 10/16, and restarted by them in          Pacemaker 2016     Priority: Medium     Formatting of this note might be different from the original.  Date of last device in office evaluation: 6/15/2021   ?  and model: [employer] Priyank Meza MRI [id number]  Pacemaker.   Date of implant: 16      ? Indication for device: SSS; Atrial fibrillation    ? Cardiac resynchronization therapy:  no    ? Battery longevity documented as less than 3  Months: no  ? Are any of the leads less than 3 months old: no    ? Programming              ? Pacing mode and programmed lower rate: AAIR/DDDR 60 bpm              ? Rate-responsive sensor type, if programmed on: accelerometer on       Underlying rhythm and heart rate if it can be determined: sinus luis rate 35      ? What is the response of this device to magnet placement: asynchronous DOO  ? PM magnet pacing rate: 85 bpm   ? Any alert status on CIED generator or lead: no    ? Last pacing threshold    Atrial   0.75 V @ 0.4ms           Ventricular RV: 1.5 V @ 0.4ms       Bradycardia with 31-40 beats per minute 2016     Priority: Medium     Varicose veins of both lower extremities 2016     Priority: Medium     Grief reaction 2015     Priority: Medium     Son  age 53       DM2 (diabetes mellitus, type 2) (H) 2015     Priority: Medium     T9 vertebral fracture (H) 2015     Priority: Medium     Rib fractures 2015     Priority: Medium     Generalized anxiety disorder 2013     Priority: Medium     Diagnosis updated by automated process. Provider to review and confirm.       Essential hypertension  "with goal blood pressure less than 130/80 12/24/2013     Priority: Medium     Chronic low back pain 07/09/2013     Priority: Medium     Normal JAYDON in 2/21       Spinal fusion failure 07/09/2013     Priority: Medium     Normal JAYDON in 2/21       Iron deficiency anemia 04/02/2013     Priority: Medium     Took iron for 2 months late 2012; and in 2013.           In 4/13, ferritin 12; in 7/13, up to 25 with Fe;  In 12/13, ferritin only 27 but Hgb up to 13.7; FIT neg in 1/14. Not taking Fe in 12/14;  56 and 14;         43 and 13 in 1/16        25 and 13.2 in 1/17; resume Fe QOD. Not taking Fe in 1/18; 58 and 17.3 in 1/18   (hgb 17.3 is an error);         13.1 and 46 in 1/19;  Add a MVI with Fe       Fibromyalgia      Priority: Medium     Sees Dr. Valverde; he retired 2012       Chronic venous insufficiency      Priority: Medium     Preventive measure 04/02/2013     Priority: Low     [facility] DATA BASE UNDER THE 12/4/12 NOTE  Colonoscopy 6/05  FIT neg in 1/14        Colonoscopy neg in 2/16        Mammogram 1/16, 1/18 ,1/19                Bone density \"normal\"  2009; ERT 9005-1628           Health Care Home 10/03/2012     Priority: Low     LISSET Velasquez, RN, PHN  FPA    [phone]      DX V65.8 REPLACED WITH [id number] (04/08/2013)          Past Medical History:   Diagnosis Date     Chronic venous insufficiency      Coronary atherosclerosis of unspecified type of vessel, native or graft     LAD stent 2008     Fibromyalgia      Impaired fasting glucose      Other and unspecified hyperlipidemia      Unspecified hypothyroidism     Hypothyroidism     Past Surgical History:   Procedure Laterality Date     APPENDECTOMY  1956     BACK SURGERY  9/12    lumbar spine fusion; Dr. Marvel BECKHAM TOTAL KNEE ARTHROPLASTY Bilateral 2004,2005     CARPAL TUNNEL RELEASE RT/LT Bilateral 1974     CHOLECYSTECTOMY  2001     HYSTERECTOMY  1977    and L oophorectomy     Current Outpatient Medications   Medication Sig " Dispense Refill     atorvastatin (LIPITOR) 40 MG tablet Take 1 tablet (40 mg) by mouth daily 90 tablet 3     Blood Glucose Monitoring Suppl (BLOOD GLUCOSE SYSTEM YARELY) KIT Dispense meter, test strips, lancets covered by pt ins. 250.00 NIDDM type II - Test 1 time/day       bumetanide (BUMEX) 1 MG tablet Take 1 tablet (1 mg) by mouth 2 times daily 180 tablet 3     busPIRone (BUSPAR) 10 MG tablet Take 1 tablet (10 mg) by mouth 2 times daily 180 tablet 3     Cholecalciferol (VITAMIN D) 2000 UNITS CAPS Take 2 capsules by mouth daily.       Cyanocobalamin (B-12) 500 MCG TABS  150 tablet      fish oil-omega-3 fatty acids (FISH OIL) 1000 MG capsule Take 1 g by mouth daily.       isosorbide mononitrate (IMDUR) 30 MG 24 hr tablet Take 30 mg by mouth       levothyroxine (SYNTHROID/LEVOTHROID) 137 MCG tablet Take 1 tablet (137 mcg) by mouth daily 90 tablet 3     metoprolol tartrate (LOPRESSOR) 50 MG tablet Take 1 tablet (50 mg) by mouth 2 times daily 180 tablet 3     Multiple Vitamins-Minerals (MULTIVITAMIN OR) Take 1 tablet by mouth daily.       omeprazole (PRILOSEC) 20 MG DR capsule TAKE 1 CAPSULE BY MOUTH ONCE DAILY 90 capsule 4     potassium chloride ER (K-TAB/KLOR-CON) 10 MEQ CR tablet Take 1 tablet (10 mEq) by mouth daily 90 tablet 3     spironolactone (ALDACTONE) 25 MG tablet Take 1 tablet (25 mg) by mouth daily 90 tablet 3     traMADol (ULTRAM) 50 MG tablet TAKE 1 TABLET BY MOUTH 3 TIMES DAILY 90 tablet 0     traZODone (DESYREL) 50 MG tablet Take 1 tablet (50 mg) by mouth At Bedtime The dose can be increased to 75 or 100 mg 90 tablet 3     Turmeric (RA TURMERIC EXTRA STRENGTH) 1053 MG TABS        warfarin ANTICOAGULANT (COUMADIN) 5 MG tablet 2.5 mg Tu/Th, 5 mg all other days            Managed by [facility] 90 tablet 4     nitroglycerin (NITROSTAT) 0.4 MG SL tablet Place 0.4 mg under the tongue (Patient not taking: Reported on 10/28/2021)         No Known Allergies     Social History     Tobacco Use     Smoking status:  "Never Smoker     Smokeless tobacco: Never Used   Substance Use Topics     Alcohol use: Yes     Comment: occ.       History   Drug Use No         Objective     /72   Pulse 75   Temp 97.5  F (36.4  C)   Ht 1.549 m (5' 1\")   Wt 78.5 kg (173 lb)   SpO2 97%   BMI 32.69 kg/m      Physical Exam    GENERAL APPEARANCE: healthy, alert and no distress     EYES: EOMI, PERRL     HENT: ear canals and TM's normal and nose and mouth without ulcers or lesions     NECK: no adenopathy, no asymmetry, masses, or scars and thyroid normal to palpation     RESP: lungs clear to auscultation - no rales, rhonchi or wheezes     CV: regular rates and rhythm, normal S1 S2, no S3 or S4 and no murmur, click or rub     ABDOMEN:  soft, nontender, no HSM or masses and bowel sounds normal     MS: extremities normal- no gross deformities noted, no evidence of inflammation in joints, FROM in all extremities.     SKIN: no suspicious lesions or rashes     NEURO: Normal strength and tone, sensory exam grossly normal, mentation intact and speech normal     PSYCH: mentation appears normal. and affect normal/bright     LYMPHATICS: No cervical adenopathy         Diagnostics:    Hemoglobin   Date Value Ref Range Status   02/08/2021 12.8 11.7 - 15.7 g/dL Final     Recent Labs   Lab Test 10/28/21  0901 02/08/21  1159    138   POTASSIUM 3.9 4.1   CHLORIDE 105 105   CO2 28 27   ANIONGAP 5 6   * 96   BUN 45* 28   CR 1.45* 1.17*   LILLIAN 9.0 9.3     Lab Results   Component Value Date    A1C 6.0 10/28/2021    A1C 6.0 02/08/2021    A1C 5.7 02/04/2020    A1C 6.2 06/11/2019    A1C 5.9 01/22/2019    A1C 5.9 01/17/2018          EKG: appears normal, NSR, 1st degree AVB, normal axis, normal intervals, no acute ST/T changes c/w ischemia, no LVH by voltage criteria, unchanged from previous tracings    Revised Cardiac Risk Index (RCRI):  The patient has the following serious cardiovascular risks for perioperative complications:   - Coronary Artery Disease " (MI, positive stress test, angina, Qs on EKG) = 1 point   - Congestive Heart Failure (pulmonary edema, PND, s3 estefani, CXR with pulmonary congestion, basilar rales) = 1 point     RCRI Interpretation: 2 points: Class III (moderate risk - 6.6% complication rate)     Estimated Functional Capacity: Performs 4 METS exercise without symptoms (e.g., light housework, stairs, 4 mph walk, 7 mph bike, slow step dance)           Signed Electronically by: Sherine Reardon CNP  Copy of this evaluation report is provided to requesting physician.

## 2021-10-28 NOTE — PATIENT INSTRUCTIONS
Hold fish oil for 7 days prior to surgery. Hold your warfarin as instructed by cardiology.   Continue your other medications as prescribed.      Preparing for Your Surgery  Getting started  A nurse will call you to review your health history and instructions. They will give you an arrival time based on your scheduled surgery time.  Please be ready to share the following:    Your doctor's clinic name and phone number    Your medical, surgical and anesthesia history    A list of allergies and sensitivities    A list of medicines, including herbal treatments and over-the-counter drugs    Whether the patient has a legal guardian (ask how to send us the papers in advance)  If you have a child who's having surgery, please ask for a copy of Preparing for Your Child's Surgery.    Preparing for surgery    Within 30 days of surgery: Have a pre-op exam (sometimes called an H&P, or History and Physical). This can be done at a clinic or pre-operative center.  ? If you're having a , you may not need this exam. Talk to your care team    At your pre-op exam, talk to your care team about all medicines you take. If you need to stop any medicines before surgery, ask when to start taking them again.  ? We do this for your safety. Many medicines can make you bleed too much during surgery. Some change how well surgery (anesthesia) drugs work.    Call your insurance company to let them know you're having surgery. (If you don't have insurance, call [phone].)    Call your clinic if there's any change in your health. This includes signs of a cold or flu (sore throat, runny nose, cough, rash, fever). It also includes a scrape or scratch near the surgery site.    If you have questions on the day of surgery, call your hospital or surgery center.  Eating and drinking guidelines  For your safety: Unless your surgeon tells you otherwise, follow the guidelines below.    Eat and drink as usual until 8 hours before surgery. After that,  no food or milk.    Drink clear liquids until 2 hours before surgery. These are liquids you can see through, like water, Gatorade and Propel Water. You may also have black coffee and tea (no cream or milk).    Nothing by mouth within 2 hours of surgery. This includes gum, candy and breath mints.    If you drink, stop drinking alcohol the night before surgery.    If your care team tells you to take medicine on the morning of surgery, it's okay to take it with a sip of water.  Preventing infection    Shower or bathe the night before and morning of your surgery. Follow the instructions your clinic gave you. (If no instructions, use regular soap.)    Don't shave or clip hair near your surgery site. We'll remove the hair if needed.    Don't smoke or vape the morning of surgery. You may chew nicotine gum up to 2 hours before surgery. A nicotine patch is okay.  ? Note: Some surgeries require you to completely quit smoking and nicotine. Check with your surgeon.    Your care team will make every effort to keep you safe from infection. We will:  ? Clean our hands often with soap and water (or an alcohol-based hand rub).  ? Clean the skin at your surgery site with a special soap that kills germs.  ? Give you a special gown to keep you warm. (Cold raises the risk of infection.)  ? Wear special hair covers, masks, gowns and gloves during surgery.  ? Give antibiotic medicine, if prescribed. Not all surgeries need antibiotics.  What to bring on the day of surgery    Photo ID and insurance card    Copy of your health care directive, if you have one    Glasses and hearing aides (bring cases)  ? You can't wear contacts during surgery    Inhaler and eye drops, if you use them (tell us about these when you arrive)    CPAP machine or breathing device, if you use them    A few personal items, if spending the night    If you have . . .  ? A pacemaker or ICD (cardiac defibrillator): Bring the ID card.  ? An implanted stimulator: Bring the  remote control.  ? A legal guardian: Bring a copy of the certified (court-stamped) guardianship papers.  Please remove any jewelry, including body piercings. Leave jewelry and other valuables at home.  If you're going home the day of surgery  Important: If you don't follow the rules below, we must cancel your surgery.     Arrange for someone to drive you home after surgery. You may not drive, take a taxi or take public transportation by yourself (unless you'll have local anesthesia only).    Arrange for a responsible adult to stay with you overnight. If you don't, we may keep you in the hospital overnight, and you may need to pay the costs yourself.  Questions?   If you have any questions for your care team, list them here: _________________________________________________________________________________________________________________________________________________________________________________________________________________________________________________________________________________________________________________________  For informational purposes only. Not to replace the advice of your health care provider. Copyright   2003, 2019 [facility]. All rights reserved. Clinically reviewed by Priscilla Tidwell MD. EXENDIS 500025 - REV 4/20.

## 2022-02-10 ENCOUNTER — TELEPHONE (OUTPATIENT)
Dept: INTERNAL MEDICINE | Facility: CLINIC | Age: 82
End: 2022-02-10
Payer: COMMERCIAL

## 2022-02-10 NOTE — TELEPHONE ENCOUNTER
Reason for Call:  Other call back    Detailed comments: Patient was contacted and asked to call back to reschedule their appointment on 2/14/22, patient was scheduled with Kelsi henry who is not accepting new  patients at this time. A voicemail was left for the patient to call back and reschedile.     Phone Number Patient can be reached at: Home number on file [phone] (home) or Cell number on file:    Telephone Information:   Mobile [phone]       Best Time: N/a    Can we leave a detailed message on this number? YES    Call taken on 2/10/2022 at 3:18 PM by Jen Morales

## 2022-02-24 ENCOUNTER — OFFICE VISIT (OUTPATIENT)
Dept: FAMILY MEDICINE | Facility: CLINIC | Age: 82
End: 2022-02-24
Payer: COMMERCIAL

## 2022-02-24 VITALS
SYSTOLIC BLOOD PRESSURE: 138 MMHG | HEIGHT: 61 IN | TEMPERATURE: 98.5 F | OXYGEN SATURATION: 99 % | HEART RATE: 78 BPM | WEIGHT: 169 LBS | DIASTOLIC BLOOD PRESSURE: 76 MMHG | BODY MASS INDEX: 31.91 KG/M2

## 2022-02-24 DIAGNOSIS — E11.9 TYPE 2 DIABETES MELLITUS WITHOUT COMPLICATION, WITHOUT LONG-TERM CURRENT USE OF INSULIN (H): ICD-10-CM

## 2022-02-24 DIAGNOSIS — I48.0 PAF (PAROXYSMAL ATRIAL FIBRILLATION) (H): Chronic | ICD-10-CM

## 2022-02-24 DIAGNOSIS — E87.6 HYPOKALEMIA: ICD-10-CM

## 2022-02-24 DIAGNOSIS — E03.9 ACQUIRED HYPOTHYROIDISM: ICD-10-CM

## 2022-02-24 DIAGNOSIS — E78.5 HYPERLIPIDEMIA WITH TARGET LDL LESS THAN 100: ICD-10-CM

## 2022-02-24 DIAGNOSIS — F41.1 GENERALIZED ANXIETY DISORDER: ICD-10-CM

## 2022-02-24 DIAGNOSIS — Z00.00 ENCOUNTER FOR MEDICARE ANNUAL WELLNESS EXAM: Primary | ICD-10-CM

## 2022-02-24 DIAGNOSIS — I25.10 CORONARY ARTERY DISEASE INVOLVING NATIVE CORONARY ARTERY OF NATIVE HEART WITHOUT ANGINA PECTORIS: ICD-10-CM

## 2022-02-24 DIAGNOSIS — I10 ESSENTIAL HYPERTENSION WITH GOAL BLOOD PRESSURE LESS THAN 130/80: ICD-10-CM

## 2022-02-24 DIAGNOSIS — K21.9 GASTROESOPHAGEAL REFLUX DISEASE WITHOUT ESOPHAGITIS: ICD-10-CM

## 2022-02-24 DIAGNOSIS — I50.30 HEART FAILURE WITH PRESERVED EJECTION FRACTION, NYHA CLASS I (H): ICD-10-CM

## 2022-02-24 DIAGNOSIS — N18.31 STAGE 3A CHRONIC KIDNEY DISEASE (H): ICD-10-CM

## 2022-02-24 PROBLEM — R19.8 ALTERNATING CONSTIPATION AND DIARRHEA: Status: ACTIVE | Noted: 2022-02-24

## 2022-02-24 PROBLEM — R15.9 INCONTINENCE OF FECES: Status: ACTIVE | Noted: 2022-02-24

## 2022-02-24 LAB
ANION GAP SERPL CALCULATED.3IONS-SCNC: 6 MMOL/L (ref 3–14)
BUN SERPL-MCNC: 49 MG/DL (ref 7–30)
CALCIUM SERPL-MCNC: 9.2 MG/DL (ref 8.5–10.1)
CHLORIDE BLD-SCNC: 101 MMOL/L (ref 94–109)
CHOLEST SERPL-MCNC: 141 MG/DL
CO2 SERPL-SCNC: 30 MMOL/L (ref 20–32)
CREAT SERPL-MCNC: 1.53 MG/DL (ref 0.52–1.04)
CREAT UR-MCNC: 23 MG/DL
FASTING STATUS PATIENT QL REPORTED: NO
GFR SERPL CREATININE-BSD FRML MDRD: 34 ML/MIN/1.73M2
GLUCOSE BLD-MCNC: 102 MG/DL (ref 70–99)
HBA1C MFR BLD: 5.9 % (ref 0–5.6)
HDLC SERPL-MCNC: 56 MG/DL
HGB BLD-MCNC: 12.1 G/DL (ref 11.7–15.7)
LDLC SERPL CALC-MCNC: 59 MG/DL
MICROALBUMIN UR-MCNC: 7 MG/L
MICROALBUMIN/CREAT UR: 30.43 MG/G CR (ref 0–25)
NONHDLC SERPL-MCNC: 85 MG/DL
POTASSIUM BLD-SCNC: 3.8 MMOL/L (ref 3.4–5.3)
SODIUM SERPL-SCNC: 137 MMOL/L (ref 133–144)
T4 FREE SERPL-MCNC: 1.45 NG/DL (ref 0.76–1.46)
TRIGL SERPL-MCNC: 131 MG/DL
TSH SERPL DL<=0.005 MIU/L-ACNC: 0.22 MU/L (ref 0.4–4)

## 2022-02-24 PROCEDURE — 80061 LIPID PANEL: CPT | Performed by: NURSE PRACTITIONER

## 2022-02-24 PROCEDURE — 80048 BASIC METABOLIC PNL TOTAL CA: CPT | Performed by: NURSE PRACTITIONER

## 2022-02-24 PROCEDURE — 84443 ASSAY THYROID STIM HORMONE: CPT | Performed by: NURSE PRACTITIONER

## 2022-02-24 PROCEDURE — 84439 ASSAY OF FREE THYROXINE: CPT | Performed by: NURSE PRACTITIONER

## 2022-02-24 PROCEDURE — 85018 HEMOGLOBIN: CPT | Performed by: NURSE PRACTITIONER

## 2022-02-24 PROCEDURE — 99397 PER PM REEVAL EST PAT 65+ YR: CPT | Performed by: NURSE PRACTITIONER

## 2022-02-24 PROCEDURE — 83036 HEMOGLOBIN GLYCOSYLATED A1C: CPT | Performed by: NURSE PRACTITIONER

## 2022-02-24 PROCEDURE — 36415 COLL VENOUS BLD VENIPUNCTURE: CPT | Performed by: NURSE PRACTITIONER

## 2022-02-24 PROCEDURE — 82043 UR ALBUMIN QUANTITATIVE: CPT | Performed by: NURSE PRACTITIONER

## 2022-02-24 PROCEDURE — 99207 PR FOOT EXAM NO CHARGE: CPT | Mod: 25 | Performed by: NURSE PRACTITIONER

## 2022-02-24 RX ORDER — METOPROLOL TARTRATE 50 MG
50 TABLET ORAL 2 TIMES DAILY
Qty: 180 TABLET | Refills: 3 | Status: SHIPPED | OUTPATIENT
Start: 2022-02-24

## 2022-02-24 RX ORDER — AMLODIPINE BESYLATE 2.5 MG/1
1 TABLET ORAL DAILY
COMMUNITY
Start: 2022-01-12 | End: 2022-02-24 | Stop reason: ALTCHOICE

## 2022-02-24 RX ORDER — SPIRONOLACTONE 25 MG/1
25 TABLET ORAL DAILY
Qty: 90 TABLET | Refills: 3 | Status: SHIPPED | OUTPATIENT
Start: 2022-02-24

## 2022-02-24 RX ORDER — AMLODIPINE BESYLATE 5 MG/1
1 TABLET ORAL DAILY
COMMUNITY
Start: 2022-02-03

## 2022-02-24 RX ORDER — BUMETANIDE 1 MG/1
1 TABLET ORAL 2 TIMES DAILY
Qty: 180 TABLET | Refills: 3 | Status: SHIPPED | OUTPATIENT
Start: 2022-02-24

## 2022-02-24 RX ORDER — ATORVASTATIN CALCIUM 40 MG/1
40 TABLET, FILM COATED ORAL DAILY
Qty: 90 TABLET | Refills: 3 | Status: SHIPPED | OUTPATIENT
Start: 2022-02-24

## 2022-02-24 RX ORDER — POTASSIUM CHLORIDE 750 MG/1
10 TABLET, EXTENDED RELEASE ORAL DAILY
Qty: 90 TABLET | Refills: 3 | Status: SHIPPED | OUTPATIENT
Start: 2022-02-24

## 2022-02-24 RX ORDER — BUSPIRONE HYDROCHLORIDE 10 MG/1
10 TABLET ORAL 2 TIMES DAILY
Qty: 180 TABLET | Refills: 3 | Status: SHIPPED | OUTPATIENT
Start: 2022-02-24

## 2022-02-24 RX ORDER — LEVOTHYROXINE SODIUM 137 UG/1
137 TABLET ORAL DAILY
Qty: 90 TABLET | Refills: 3 | Status: SHIPPED | OUTPATIENT
Start: 2022-02-24

## 2022-02-24 ASSESSMENT — ENCOUNTER SYMPTOMS
DIARRHEA: 0
ABDOMINAL PAIN: 0
DYSURIA: 0
PARESTHESIAS: 0
HEMATOCHEZIA: 0
HEADACHES: 0
MYALGIAS: 1
EYE PAIN: 0
COUGH: 0
SORE THROAT: 0
BREAST MASS: 0
HEARTBURN: 1
WEAKNESS: 1
PALPITATIONS: 0
HEMATURIA: 0
SHORTNESS OF BREATH: 1
NAUSEA: 0
ARTHRALGIAS: 1
NERVOUS/ANXIOUS: 0
JOINT SWELLING: 1
DIZZINESS: 0
CHILLS: 0
CONSTIPATION: 1
FEVER: 0
FREQUENCY: 0

## 2022-02-24 ASSESSMENT — ACTIVITIES OF DAILY LIVING (ADL): CURRENT_FUNCTION: NO ASSISTANCE NEEDED

## 2022-02-24 NOTE — PATIENT INSTRUCTIONS
Labs today.   I sent refills on most of your medications.  Let me know if something was missed.           Patient Education   Personalized Prevention Plan  You are due for the preventive services outlined below.  Your care team is available to assist you in scheduling these services.  If you have already completed any of these items, please share that information with your care team to update in your medical record.  Health Maintenance Due   Topic Date Due     Kidney Microalbumin Urine Test  Never done     Diabetic Foot Exam  Never done     URINE DRUG SCREEN  Never done     ANNUAL REVIEW OF  ORDERS  Never done     Eye Exam  Never done     Zoster (Shingles) Vaccine (1 of 2) Never done     Flu Vaccine (1) 09/01/2021     PHQ-2  01/01/2022     A1C Lab  01/28/2022     Cholesterol Lab  02/08/2022     FALL RISK ASSESSMENT  02/08/2022     Hemoglobin  02/08/2022

## 2022-02-24 NOTE — LETTER
February 28, 2022      Jennifer Torres  [id number]  [place] [address]        Jennifer,     Labs are stable for your.     Sherine Reardon, CNP       Resulted Orders   Albumin Random Urine Quantitative with Creat Ratio   Result Value Ref Range    Creatinine Urine mg/dL 23 mg/dL    Albumin Urine mg/L 7 mg/L    Albumin Urine mg/g Cr 30.43 (H) 0.00 - 25.00 mg/g Cr   HEMOGLOBIN A1C   Result Value Ref Range    Hemoglobin A1C 5.9 (H) 0.0 - 5.6 %      Comment:      Normal <5.7%   Prediabetes 5.7-6.4%    Diabetes 6.5% or higher     Note: Adopted from ADA consensus guidelines.   Lipid panel reflex to direct LDL Fasting   Result Value Ref Range    Cholesterol 141 <200 mg/dL    Triglycerides 131 <150 mg/dL    Direct Measure HDL 56 >=50 mg/dL    LDL Cholesterol Calculated 59 <=100 mg/dL    Non HDL Cholesterol 85 <130 mg/dL    Patient Fasting > 8hrs? No     Narrative    Cholesterol  Desirable:  <200 mg/dL    Triglycerides  Normal:  Less than 150 mg/dL  Borderline High:  150-199 mg/dL  High:  200-499 mg/dL  Very High:  Greater than or equal to 500 mg/dL    Direct Measure HDL  Female:  Greater than or equal to 50 mg/dL   Male:  Greater than or equal to 40 mg/dL    LDL Cholesterol  Desirable:  <100mg/dL  Above Desirable:  100-129 mg/dL   Borderline High:  130-159 mg/dL   High:  160-189 mg/dL   Very High:  >= 190 mg/dL    Non HDL Cholesterol  Desirable:  130 mg/dL  Above Desirable:  130-159 mg/dL  Borderline High:  160-189 mg/dL  High:  190-219 mg/dL  Very High:  Greater than or equal to 220 mg/dL   Hemoglobin   Result Value Ref Range    Hemoglobin 12.1 11.7 - 15.7 g/dL   Basic metabolic panel  (Ca, Cl, CO2, Creat, Gluc, K, Na, BUN)   Result Value Ref Range    Sodium 137 133 - 144 mmol/L    Potassium 3.8 3.4 - 5.3 mmol/L    Chloride 101 94 - 109 mmol/L    Carbon Dioxide (CO2) 30 20 - 32 mmol/L    Anion Gap 6 3 - 14 mmol/L    Urea Nitrogen 49 (H) 7 - 30 mg/dL    Creatinine 1.53 (H) 0.52 - 1.04 mg/dL    Calcium 9.2  8.5 - 10.1 mg/dL    Glucose 102 (H) 70 - 99 mg/dL    GFR Estimate 34 (L) >60 mL/min/1.73m2      Comment:      Effective December 21, 2021 eGFRcr in adults is calculated using the 2021 CKD-EPI creatinine equation which includes age and gender (Carolyn antonio al., NEJ, DOI: 10.1056/GSNTet0512861)   TSH with free T4 reflex   Result Value Ref Range    TSH 0.22 (L) 0.40 - 4.00 mU/L   T4 free   Result Value Ref Range    Free T4 1.45 0.76 - 1.46 ng/dL

## 2022-02-24 NOTE — PROGRESS NOTES
"SUBJECTIVE:   Jennifer Torres is a 81 year old female who presents for Preventive Visit.      Doing very well, no concerns today.   Pain better controlled after spine stimulator.  Still taking Tramadol- prescribed by [facility] pain clinic.   Feeling good, no depression.   No chest pain, shortness of breath, palpitations, lower extremity edema or cough.    Got hearing hearing aids. Not wearing often.     [facility]  [facility]       Patient has been advised of split billing requirements and indicates understanding: Yes  Are you in the first 12 months of your Medicare coverage?  No    Healthy Habits:     In general, how would you rate your overall health?  Good    Frequency of exercise:  2-3 days/week    Duration of exercise:  Less than 15 minutes    Do you usually eat at least 4 servings of fruit and vegetables a day, include whole grains    & fiber and avoid regularly eating high fat or \"junk\" foods?  No    Taking medications regularly:  Yes    Medication side effects:  None    Ability to successfully perform activities of daily living:  No assistance needed    Home Safety:  No safety concerns identified    Hearing Impairment:  Difficulty following a conversation in a noisy restaurant or crowded room and need to ask people to speak up or repeat themselves    In the past 6 months, have you been bothered by leaking of urine? Yes    In general, how would you rate your overall mental or emotional health?  Good      PHQ-2 Total Score: 0    Additional concerns today:  No    Do you feel safe in your environment? Yes    Have you ever done Advance Care Planning? (For example, a Health Directive, POLST, or a discussion with a medical provider or your loved ones about your wishes): Yes, patient states has an Advance Care Planning document and will bring a copy to the clinic.       Fall risk  Fallen 2 or more times in the past year?: No  Any fall with injury in the past year?: No  click delete " button to remove this line now  Cognitive Screening   1) Repeat 3 items (Leader, Season, Table)      2) Clock draw: NORMAL  3) 3 item recall: Recalls 3 objects  Results: 3 items recalled: COGNITIVE IMPAIRMENT LESS LIKELY    Mini-CogTM Copyright S James. Licensed by the author for use in [facility]; reprinted with permission (iglesia@[facility]). All rights reserved.      Do you have sleep apnea, excessive snoring or daytime drowsiness?: yes    Reviewed and updated as needed this visit by clinical staff   Tobacco  Allergies  Meds  Problems  Med Hx  Surg Hx  Fam Hx          Reviewed and updated as needed this visit by Provider   Tobacco  Allergies  Meds  Problems  Med Hx  Surg Hx  Fam Hx         Social History     Tobacco Use     Smoking status: Never Smoker     Smokeless tobacco: Never Used   Substance Use Topics     Alcohol use: Yes     Comment: occ.     If you drink alcohol do you typically have >3 drinks per day or >7 drinks per week? No    Alcohol Use 2/24/2022   Prescreen: >3 drinks/day or >7 drinks/week? Not Applicable   Prescreen: >3 drinks/day or >7 drinks/week? -       Current providers sharing in care for this patient include:   Patient Care Team:  Sherine Reardon CNP as PCP - Cheryl Vinson RN as  (Family Medicine - Geriatric Medicine)  Sherine Reardon CNP as Assigned PCP    The following health maintenance items are reviewed in Epic and correct as of today:  Health Maintenance Due   Topic Date Due     ANNUAL REVIEW OF  ORDERS  Never done     ZOSTER IMMUNIZATION (1 of 2) Never done       Mammogram Screening - Patient over age 75, has elected to continue with screening.  Pertinent mammograms are reviewed under the imaging tab.    Review of Systems   Constitutional: Negative for chills and fever.   HENT: Positive for hearing loss. Negative for congestion, ear pain and sore throat.    Eyes: Negative for pain and visual disturbance.   Respiratory:  "Positive for shortness of breath. Negative for cough.    Cardiovascular: Positive for peripheral edema. Negative for chest pain and palpitations.   Gastrointestinal: Positive for constipation and heartburn. Negative for abdominal pain, diarrhea, hematochezia and nausea.   Breasts:  Negative for breast mass.   Genitourinary: Negative for dysuria, frequency, genital sores, hematuria, pelvic pain, urgency and vaginal discharge.   Musculoskeletal: Positive for arthralgias, joint swelling and myalgias.   Skin: Negative for rash.   Neurological: Positive for weakness. Negative for dizziness, headaches and paresthesias.   Psychiatric/Behavioral: Negative for mood changes. The patient is not nervous/anxious.          OBJECTIVE:   /76   Pulse 78   Temp 98.5  F (36.9  C)   Ht 1.549 m (5' 1\")   Wt 76.7 kg (169 lb)   SpO2 99%   BMI 31.93 kg/m   Estimated body mass index is 31.93 kg/m  as calculated from the following:    Height as of this encounter: 1.549 m (5' 1\").    Weight as of this encounter: 76.7 kg (169 lb).  Physical Exam  GENERAL: healthy, alert and no distress  EYES: Eyes grossly normal to inspection, PERRL and conjunctivae and sclerae normal  HENT: ear canals and TM's normal, nose and mouth without ulcers or lesions  NECK: no adenopathy, no asymmetry, masses, or scars and thyroid normal to palpation  RESP: lungs clear to auscultation - no rales, rhonchi or wheezes  CV: regular rate and rhythm, normal S1 S2, no S3 or S4, no murmur, click or rub, no peripheral edema and peripheral pulses strong  ABDOMEN: soft, nontender, no hepatosplenomegaly, no masses and bowel sounds normal  MS: no gross musculoskeletal defects noted, no edema  SKIN: no suspicious lesions or rashes  NEURO: Normal strength and tone, mentation intact and speech normal  PSYCH: mentation appears normal, affect normal/bright    Diagnostic Test Results:  Labs reviewed in [facility]      Recent Labs   Lab Test 02/24/22  1118 10/28/21  0901    " 138   POTASSIUM 3.8 3.9   CHLORIDE 101 105   CO2 30 28   ANIONGAP 6 5   * 101*   BUN 49* 45*   CR 1.53* 1.45*   LILLIAN 9.2 9.0     CBC RESULTS: Recent Labs   Lab Test 02/24/22  1118 02/08/21  1159   WBC  --  7.0   RBC  --  4.30   HGB 12.1 12.8   HCT  --  40.5   MCV  --  94   MCH  --  29.8   MCHC  --  31.6   RDW  --  13.9   PLT  --  216     Lab Results   Component Value Date    A1C 5.9 02/24/2022    A1C 6.0 10/28/2021    A1C 6.0 02/08/2021    A1C 5.7 02/04/2020    A1C 6.2 06/11/2019    A1C 5.9 01/22/2019    A1C 5.9 01/17/2018     TSH   Date Value Ref Range Status   02/24/2022 0.22 (L) 0.40 - 4.00 mU/L Final   02/08/2021 8.71 (H) 0.40 - 4.00 mU/L Final     T4 Free   Date Value Ref Range Status   02/08/2021 1.04 0.76 - 1.46 ng/dL Final     Free T4   Date Value Ref Range Status   02/24/2022 1.45 0.76 - 1.46 ng/dL Final         ASSESSMENT / PLAN:   Jennifer was seen today for physical.    Diagnoses and all orders for this visit:    Encounter for Medicare annual wellness exam    Type 2 diabetes mellitus without complication, without long-term current use of insulin (H)  Chronic, stable. Diet controlled.    -     WI FOOT EXAM NO CHARGE  -     Albumin Random Urine Quantitative with Creat Ratio  -     HEMOGLOBIN A1C  -     Basic metabolic panel  (Ca, Cl, CO2, Creat, Gluc, K, Na, BUN)    Acquired hypothyroidism  Chronic, stable.   -     levothyroxine (SYNTHROID/LEVOTHROID) 137 MCG tablet; Take 1 tablet (137 mcg) by mouth daily  -     TSH with free T4 reflex  -     T4 free    Stage 3a chronic kidney disease (H)  Chronic, stable.   -     Albumin Random Urine Quantitative with Creat Ratio  -     Hemoglobin  -     Basic metabolic panel  (Ca, Cl, CO2, Creat, Gluc, K, Na, BUN)    Essential hypertension with goal blood pressure less than 130/80  Chronic, stable.   -     metoprolol tartrate (LOPRESSOR) 50 MG tablet; Take 1 tablet (50 mg) by mouth 2 times daily  -     spironolactone (ALDACTONE) 25 MG tablet; Take 1 tablet (25 mg)  by mouth daily  -     bumetanide (BUMEX) 1 MG tablet; Take 1 tablet (1 mg) by mouth 2 times daily  -     potassium chloride ER (K-TAB/KLOR-CON) 10 MEQ CR tablet; Take 1 tablet (10 mEq) by mouth daily  -     Albumin Random Urine Quantitative with Creat Ratio  -     Hemoglobin  -     Basic metabolic panel  (Ca, Cl, CO2, Creat, Gluc, K, Na, BUN)    PAF (paroxysmal atrial fibrillation) (H)  Chronic, stable.  On warfarin, managed by [facility] cardiology INR clinic.   -     metoprolol tartrate (LOPRESSOR) 50 MG tablet; Take 1 tablet (50 mg) by mouth 2 times daily    Hyperlipidemia with target LDL less than 100  Chronic, stable.   -     atorvastatin (LIPITOR) 40 MG tablet; Take 1 tablet (40 mg) by mouth daily  -     Lipid panel reflex to direct LDL Fasting    Heart failure with preserved ejection fraction, NYHA class I (H)  Chronic, stable.   -     spironolactone (ALDACTONE) 25 MG tablet; Take 1 tablet (25 mg) by mouth daily  -     bumetanide (BUMEX) 1 MG tablet; Take 1 tablet (1 mg) by mouth 2 times daily  -     potassium chloride ER (K-TAB/KLOR-CON) 10 MEQ CR tablet; Take 1 tablet (10 mEq) by mouth daily  -     Basic metabolic panel  (Ca, Cl, CO2, Creat, Gluc, K, Na, BUN)    Coronary artery disease involving native coronary artery of native heart without angina pectoris  Chronic, stable.   -     atorvastatin (LIPITOR) 40 MG tablet; Take 1 tablet (40 mg) by mouth daily  -     Lipid panel reflex to direct LDL Fasting    Hypokalemia  Chronic, stable.   -     potassium chloride ER (K-TAB/KLOR-CON) 10 MEQ CR tablet; Take 1 tablet (10 mEq) by mouth daily    Generalized anxiety disorder  Chronic, stable.   -     busPIRone (BUSPAR) 10 MG tablet; Take 1 tablet (10 mg) by mouth 2 times daily    Gastroesophageal reflux disease without esophagitis  Chronic, stable.   -     omeprazole (PRILOSEC) 20 MG DR capsule; TAKE 1 CAPSULE BY MOUTH ONCE DAILY        Patient has been advised of split billing requirements and indicates understanding:  "Yes    COUNSELING:  Reviewed preventive health counseling, as reflected in patient instructions    Estimated body mass index is 31.93 kg/m  as calculated from the following:    Height as of this encounter: 1.549 m (5' 1\").    Weight as of this encounter: 76.7 kg (169 lb).    Weight management plan: work on diet and exercise    She reports that she has never smoked. She has never used smokeless tobacco.      Appropriate preventive services were discussed with this patient, including applicable screening as appropriate for cardiovascular disease, diabetes, osteopenia/osteoporosis, and glaucoma.  As appropriate for age/gender, discussed screening for colorectal cancer, prostate cancer, breast cancer, and cervical cancer. Checklist reviewing preventive services available has been given to the patient.    Reviewed patients plan of care and provided an AVS. The Basic Care Plan (routine screening as documented in Health Maintenance) for Jennifer meets the Care Plan requirement. This Care Plan has been established and reviewed with the Patient.    Counseling Resources:  ATP IV Guidelines  Pooled Cohorts Equation Calculator  Breast Cancer Risk Calculator  Breast Cancer: Medication to Reduce Risk  FRAX Risk Assessment  ICSI Preventive Guidelines  Dietary Guidelines for Americans, 2010  USDA's MyPlate  ASA Prophylaxis  Lung CA Screening    Sherine Reardon, CNP  [facility]    Identified Health Risks:  "

## 2023-12-27 ENCOUNTER — TRANSFERRED RECORDS (OUTPATIENT)
Dept: HEALTH INFORMATION MANAGEMENT | Facility: CLINIC | Age: 83
End: 2023-12-27
Payer: COMMERCIAL

## 2024-01-18 ENCOUNTER — PATIENT OUTREACH (OUTPATIENT)
Dept: CARE COORDINATION | Facility: CLINIC | Age: 84
End: 2024-01-18
Payer: COMMERCIAL

## 2024-02-01 ENCOUNTER — PATIENT OUTREACH (OUTPATIENT)
Dept: CARE COORDINATION | Facility: CLINIC | Age: 84
End: 2024-02-01
Payer: COMMERCIAL

## 2025-08-06 ENCOUNTER — TRANSFERRED RECORDS (OUTPATIENT)
Dept: HEALTH INFORMATION MANAGEMENT | Facility: CLINIC | Age: 85
End: 2025-08-06
Payer: COMMERCIAL